# Patient Record
Sex: FEMALE | Race: WHITE | NOT HISPANIC OR LATINO | URBAN - METROPOLITAN AREA
[De-identification: names, ages, dates, MRNs, and addresses within clinical notes are randomized per-mention and may not be internally consistent; named-entity substitution may affect disease eponyms.]

---

## 2017-11-21 ENCOUNTER — COMMUNICATION - HEALTHEAST (OUTPATIENT)
Dept: SCHEDULING | Facility: CLINIC | Age: 77
End: 2017-11-21

## 2017-11-22 ENCOUNTER — COMMUNICATION - HEALTHEAST (OUTPATIENT)
Dept: INTERNAL MEDICINE | Facility: CLINIC | Age: 77
End: 2017-11-22

## 2017-11-30 ENCOUNTER — OFFICE VISIT - RIVER FALLS (OUTPATIENT)
Dept: FAMILY MEDICINE | Facility: CLINIC | Age: 77
End: 2017-11-30

## 2017-11-30 ASSESSMENT — MIFFLIN-ST. JEOR: SCORE: 1124.71

## 2018-01-09 ENCOUNTER — OFFICE VISIT - RIVER FALLS (OUTPATIENT)
Dept: FAMILY MEDICINE | Facility: CLINIC | Age: 78
End: 2018-01-09

## 2018-01-09 ASSESSMENT — MIFFLIN-ST. JEOR
SCORE: 1123.13
SCORE: 1110.19

## 2018-01-10 LAB
CREAT SERPL-MCNC: 1.16 MG/DL (ref 0.6–0.93)
GLUCOSE BLD-MCNC: 94 MG/DL (ref 65–99)

## 2018-01-15 ENCOUNTER — ANESTHESIA - HEALTHEAST (OUTPATIENT)
Dept: SURGERY | Facility: HOSPITAL | Age: 78
End: 2018-01-15

## 2018-01-15 ENCOUNTER — SURGERY - HEALTHEAST (OUTPATIENT)
Dept: SURGERY | Facility: HOSPITAL | Age: 78
End: 2018-01-15

## 2018-02-15 ENCOUNTER — OFFICE VISIT - RIVER FALLS (OUTPATIENT)
Dept: FAMILY MEDICINE | Facility: CLINIC | Age: 78
End: 2018-02-15

## 2018-04-05 ENCOUNTER — OFFICE VISIT - RIVER FALLS (OUTPATIENT)
Dept: FAMILY MEDICINE | Facility: CLINIC | Age: 78
End: 2018-04-05

## 2018-04-06 LAB
CREAT SERPL-MCNC: 1.05 MG/DL (ref 0.6–0.93)
GLUCOSE BLD-MCNC: 96 MG/DL (ref 65–99)

## 2018-05-04 ENCOUNTER — COMMUNICATION - RIVER FALLS (OUTPATIENT)
Dept: FAMILY MEDICINE | Facility: CLINIC | Age: 78
End: 2018-05-04

## 2018-05-04 ENCOUNTER — AMBULATORY - RIVER FALLS (OUTPATIENT)
Dept: FAMILY MEDICINE | Facility: CLINIC | Age: 78
End: 2018-05-04

## 2018-06-05 ENCOUNTER — RECORDS - HEALTHEAST (OUTPATIENT)
Dept: ADMINISTRATIVE | Facility: OTHER | Age: 78
End: 2018-06-05

## 2018-06-19 ENCOUNTER — HOSPITAL ENCOUNTER (OUTPATIENT)
Dept: NUCLEAR MEDICINE | Facility: CLINIC | Age: 78
Discharge: HOME | End: 2018-06-19
Attending: INTERNAL MEDICINE

## 2018-06-19 DIAGNOSIS — R10.9 ABDOMINAL PAIN: ICD-10-CM

## 2018-06-22 ENCOUNTER — COMMUNICATION - RIVER FALLS (OUTPATIENT)
Dept: FAMILY MEDICINE | Facility: CLINIC | Age: 78
End: 2018-06-22

## 2018-06-22 ENCOUNTER — OFFICE VISIT - RIVER FALLS (OUTPATIENT)
Dept: FAMILY MEDICINE | Facility: CLINIC | Age: 78
End: 2018-06-22

## 2018-08-24 ENCOUNTER — OFFICE VISIT - RIVER FALLS (OUTPATIENT)
Dept: FAMILY MEDICINE | Facility: CLINIC | Age: 78
End: 2018-08-24

## 2018-09-06 ENCOUNTER — OFFICE VISIT - RIVER FALLS (OUTPATIENT)
Dept: FAMILY MEDICINE | Facility: CLINIC | Age: 78
End: 2018-09-06

## 2018-10-11 ENCOUNTER — OFFICE VISIT - RIVER FALLS (OUTPATIENT)
Dept: FAMILY MEDICINE | Facility: CLINIC | Age: 78
End: 2018-10-11

## 2018-10-11 ASSESSMENT — MIFFLIN-ST. JEOR: SCORE: 1128.34

## 2019-01-01 ENCOUNTER — RECORDS - HEALTHEAST (OUTPATIENT)
Dept: LAB | Facility: CLINIC | Age: 79
End: 2019-01-01

## 2019-01-01 ENCOUNTER — OFFICE VISIT - RIVER FALLS (OUTPATIENT)
Dept: FAMILY MEDICINE | Facility: CLINIC | Age: 79
End: 2019-01-01

## 2019-01-01 ENCOUNTER — COMMUNICATION - RIVER FALLS (OUTPATIENT)
Dept: FAMILY MEDICINE | Facility: CLINIC | Age: 79
End: 2019-01-01

## 2019-01-01 LAB
25(OH)D3 SERPL-MCNC: 38.7 NG/ML (ref 30–80)
ALBUMIN SERPL-MCNC: 3.2 G/DL (ref 3.5–5)
ALBUMIN SERPL-MCNC: 3.4 G/DL (ref 3.5–5)
ALBUMIN UR-MCNC: ABNORMAL G/DL
ALBUMIN UR-MCNC: ABNORMAL MG/DL
ALBUMIN UR-MCNC: NEGATIVE MG/DL
ALP SERPL-CCNC: 68 U/L (ref 45–120)
ALP SERPL-CCNC: 77 U/L (ref 45–120)
ALT SERPL W P-5'-P-CCNC: 13 U/L (ref 0–45)
ALT SERPL W P-5'-P-CCNC: 25 U/L (ref 0–45)
ANION GAP SERPL CALCULATED.3IONS-SCNC: 11 MMOL/L (ref 5–18)
ANION GAP SERPL CALCULATED.3IONS-SCNC: 14 MMOL/L (ref 5–18)
APPEARANCE UR: ABNORMAL
APPEARANCE UR: CLEAR
APPEARANCE UR: CLEAR
AST SERPL W P-5'-P-CCNC: 18 U/L (ref 0–40)
AST SERPL W P-5'-P-CCNC: 35 U/L (ref 0–40)
BACTERIA #/AREA URNS HPF: ABNORMAL HPF
BACTERIA #/AREA URNS HPF: NORMAL /[HPF]
BACTERIA SPEC CULT: ABNORMAL
BACTERIA SPEC CULT: NO GROWTH
BACTERIA SPEC CULT: NORMAL
BILIRUB DIRECT SERPL-MCNC: 0.2 MG/DL
BILIRUB SERPL-MCNC: 0.3 MG/DL (ref 0–1)
BILIRUB SERPL-MCNC: 0.4 MG/DL (ref 0–1)
BILIRUB UR QL STRIP: NEGATIVE
BUN SERPL-MCNC: 10 MG/DL (ref 8–28)
BUN SERPL-MCNC: 8 MG/DL (ref 8–28)
CALCIUM SERPL-MCNC: 10 MG/DL (ref 8.5–10.5)
CALCIUM SERPL-MCNC: 9 MG/DL (ref 8.5–10.5)
CHLORIDE BLD-SCNC: 103 MMOL/L (ref 98–107)
CHLORIDE BLD-SCNC: 99 MMOL/L (ref 98–107)
CO2 SERPL-SCNC: 22 MMOL/L (ref 22–31)
CO2 SERPL-SCNC: 26 MMOL/L (ref 22–31)
COLOR UR AUTO: ABNORMAL
COLOR UR AUTO: NORMAL
COLOR UR AUTO: NORMAL
COLOR UR AUTO: YELLOW
CREAT SERPL-MCNC: 0.79 MG/DL (ref 0.6–1.1)
CREAT SERPL-MCNC: 0.81 MG/DL (ref 0.6–1.1)
EPITHELIAL CELLS UR: NORMAL
ERYTHROCYTE [DISTWIDTH] IN BLOOD BY AUTOMATED COUNT: 13.5 % (ref 11–14.5)
ERYTHROCYTE [DISTWIDTH] IN BLOOD BY AUTOMATED COUNT: 15.7 % (ref 11–14.5)
FERRITIN SERPL-MCNC: 266 NG/ML (ref 10–130)
GFR SERPL CREATININE-BSD FRML MDRD: >60 ML/MIN/1.73M2
GFR SERPL CREATININE-BSD FRML MDRD: >60 ML/MIN/1.73M2
GLUCOSE BLD-MCNC: 111 MG/DL (ref 70–125)
GLUCOSE BLD-MCNC: 78 MG/DL (ref 70–125)
GLUCOSE UR STRIP-MCNC: NEGATIVE MG/DL
HCT VFR BLD AUTO: 32.4 % (ref 35–47)
HCT VFR BLD AUTO: 36.9 % (ref 35–47)
HGB BLD-MCNC: 10.6 G/DL (ref 12–16)
HGB BLD-MCNC: 11.9 G/DL (ref 12–16)
HGB UR QL STRIP: ABNORMAL
HGB UR QL STRIP: NEGATIVE
IRON SERPL-MCNC: 79 UG/DL (ref 42–175)
KETONES UR STRIP-MCNC: ABNORMAL MG/DL
KETONES UR STRIP-MCNC: NEGATIVE MG/DL
LEUKOCYTE ESTERASE UR QL STRIP: ABNORMAL
LEUKOCYTE ESTERASE UR QL STRIP: NEGATIVE
MAGNESIUM SERPL-MCNC: 1.8 MG/DL (ref 1.8–2.6)
MCH RBC QN AUTO: 30.7 PG (ref 27–34)
MCH RBC QN AUTO: 31.1 PG (ref 27–34)
MCHC RBC AUTO-ENTMCNC: 32.2 G/DL (ref 32–36)
MCHC RBC AUTO-ENTMCNC: 32.7 G/DL (ref 32–36)
MCV RBC AUTO: 95 FL (ref 80–100)
MCV RBC AUTO: 95 FL (ref 80–100)
MUCOUS THREADS #/AREA URNS LPF: ABNORMAL LPF
NITRATE UR QL: NEGATIVE
PH UR STRIP: 6 [PH] (ref 4.5–8)
PH UR STRIP: 6 [PH] (ref 4.5–8)
PH UR STRIP: 6.5 [PH] (ref 4.5–8)
PH UR STRIP: 7 [PH] (ref 4.5–8)
PH UR STRIP: 7 [PH] (ref 5–8)
PLATELET # BLD AUTO: 211 THOU/UL (ref 140–440)
PLATELET # BLD AUTO: 302 THOU/UL (ref 140–440)
PMV BLD AUTO: 9.6 FL (ref 8.5–12.5)
PMV BLD AUTO: 9.7 FL (ref 8.5–12.5)
POTASSIUM BLD-SCNC: 3.6 MMOL/L (ref 3.5–5)
POTASSIUM BLD-SCNC: 3.9 MMOL/L (ref 3.5–5)
PROT SERPL-MCNC: 6.3 G/DL (ref 6–8)
PROT SERPL-MCNC: 7 G/DL (ref 6–8)
RBC # BLD AUTO: 3.41 MILL/UL (ref 3.8–5.4)
RBC # BLD AUTO: 3.87 MILL/UL (ref 3.8–5.4)
RBC #/AREA URNS AUTO: ABNORMAL HPF
RBC #/AREA URNS AUTO: NORMAL /[HPF]
SODIUM SERPL-SCNC: 135 MMOL/L (ref 136–145)
SODIUM SERPL-SCNC: 140 MMOL/L (ref 136–145)
SP GR UR STRIP: 1.01 (ref 1–1.03)
SP GR UR STRIP: 1.02 (ref 1–1.03)
SP GR UR STRIP: 1.03 (ref 1–1.03)
SQUAMOUS #/AREA URNS AUTO: ABNORMAL LPF
TRANS CELLS #/AREA URNS HPF: ABNORMAL LPF
TSH SERPL DL<=0.005 MIU/L-ACNC: 1.84 UIU/ML (ref 0.3–5)
UROBILINOGEN UR STRIP-ACNC: ABNORMAL
UROBILINOGEN UR STRIP-ACNC: NORMAL
UROBILINOGEN UR STRIP-ACNC: NORMAL
VALPROATE SERPL-MCNC: 33.2 UG/ML (ref 50–150)
VALPROATE SERPL-MCNC: 39.6 UG/ML (ref 50–150)
VIT B12 SERPL-MCNC: 904 PG/ML (ref 213–816)
WBC #/AREA URNS AUTO: >100 /[HPF]
WBC #/AREA URNS AUTO: >100 HPF
WBC #/AREA URNS AUTO: >100 HPF
WBC #/AREA URNS AUTO: ABNORMAL HPF
WBC CLUMPS #/AREA URNS HPF: PRESENT /[HPF]
WBC: 6.7 THOU/UL (ref 4–11)
WBC: 7 THOU/UL (ref 4–11)

## 2021-05-31 VITALS — BODY MASS INDEX: 27.11 KG/M2 | HEIGHT: 63 IN | WEIGHT: 153 LBS

## 2021-06-15 NOTE — ANESTHESIA CARE TRANSFER NOTE
Last vitals:   Vitals:    01/15/18 1500   BP: (!) 180/94   Pulse: 85   Resp: 17   Temp:    SpO2: 100%   Temp=97.2 oral     Patient's level of consciousness is drowsy  Spontaneous respirations: yes  Maintains airway independently: yes  Dentition unchanged: yes  Oropharynx: oropharynx clear of all foreign objects    QCDR Measures:  ASA# 20 - Surgical Safety Checklist: WHO surgical safety checklist completed prior to induction  PQRS# 430 - Adult PONV Prevention: 4558F - Pt received => 2 anti-emetic agents (different classes) preop & intraop  ASA# 8 - Peds PONV Prevention: NA - Not pediatric patient, not GA or 2 or more risk factors NOT present  PQRS# 424 - Ora-op Temp Management: 4559F - At least one body temp DOCUMENTED => 35.5C or 95.9F within required timeframe  PQRS# 426 - PACU Transfer Protocol: - Transfer of care checklist used  ASA# 14 - Acute Post-op Pain: ASA14B - Patient did NOT experience pain >= 7 out of 10

## 2021-06-15 NOTE — ANESTHESIA PREPROCEDURE EVALUATION
"Anesthesia Evaluation      Patient summary reviewed   No history of anesthetic complications     Airway   Mallampati: II  Neck ROM: full   Pulmonary - negative ROS and normal exam                          Cardiovascular - negative ROS and normal exam   Neuro/Psych    (+) neuromuscular disease,  dementia,     Endo/Other - negative ROS      GI/Hepatic/Renal    (+) GERD well controlled,   chronic renal disease CRI,      Other findings: Sensory neuropathy. Daughter states pt is very sensitive to narcotics, was on vicodin at home post-op and evidently \"passed out\" and 911 was called, also blaming narcotics for post-op memory loss.  No records to review.  Chart lists GERD but pt denies having it.  Had nephrectomy for renal cell Ca.  Nephrolithiasis.  Breast Ca.  K 4.3, Hg 13.6.      Dental - normal exam                        Anesthesia Plan  Planned anesthetic: general LMA  ETT if surgeon requests one.  No versed.  ASA 3   Induction: intravenous   Anesthetic plan and risks discussed with: patient and child/children    Post-op plan: routine recovery          "

## 2021-06-15 NOTE — ANESTHESIA POSTPROCEDURE EVALUATION
Patient: Debbie LOVE [place]  CYSTOSCOPY LEFT RETROGRADE PYELOGRAM, LEFT URETEROSCOPY, LASER LITHOTRIPSY, AND BASKET EXTRACTION OF STONE, LEFT STENT PLACEMENT  Anesthesia type: general    Patient location: PACU  Last vitals:   Vitals:    01/15/18 1505   BP: (!) 187/96   Pulse: 82   Resp: 16   Temp:    SpO2: 99%     Post vital signs: stable; BPs in pre-op had been quite high, they were going to take some manual ones as pt felt the high readings were from cuff pain.  Was not notified of new pressures and pt was up before induction in OR.  Dropped to systolic of 110 after induction.  BP elevated in PACU, pt comfortable, treating with hydralazine presently.  Level of consciousness: alert and conversant  Post-anesthesia pain: pain controlled  Post-anesthesia nausea and vomiting: no  Pulmonary: supplemental oxygen when seen.  Cardiovascular: stable and blood pressure at baseline  Hydration: adequate  Anesthetic events: no    QCDR Measures:  ASA# 11 - Ora-op Cardiac Arrest: ASA11B - Patient did NOT experience unanticipated cardiac arrest  ASA# 12 - Ora-op Mortality Rate: ASA12B - Patient did NOT die  ASA# 13 - PACU Re-Intubation Rate: ASA13B - Patient did NOT require a new airway mgmt  ASA# 10 - Composite Anes Safety: ASA10A - No serious adverse event    Additional Notes:

## 2022-02-11 VITALS
TEMPERATURE: 98.6 F | WEIGHT: 153 LBS | DIASTOLIC BLOOD PRESSURE: 89 MMHG | HEART RATE: 80 BPM | BODY MASS INDEX: 27.11 KG/M2 | HEART RATE: 76 BPM | SYSTOLIC BLOOD PRESSURE: 153 MMHG | HEIGHT: 63 IN | DIASTOLIC BLOOD PRESSURE: 88 MMHG | WEIGHT: 154.8 LBS | BODY MASS INDEX: 27.86 KG/M2 | SYSTOLIC BLOOD PRESSURE: 150 MMHG

## 2022-02-11 VITALS
WEIGHT: 151.8 LBS | TEMPERATURE: 98.9 F | DIASTOLIC BLOOD PRESSURE: 78 MMHG | DIASTOLIC BLOOD PRESSURE: 91 MMHG | WEIGHT: 159 LBS | SYSTOLIC BLOOD PRESSURE: 132 MMHG | HEART RATE: 92 BPM | SYSTOLIC BLOOD PRESSURE: 120 MMHG | HEART RATE: 88 BPM | TEMPERATURE: 97.9 F

## 2022-02-11 VITALS
SYSTOLIC BLOOD PRESSURE: 158 MMHG | DIASTOLIC BLOOD PRESSURE: 86 MMHG | WEIGHT: 151.8 LBS | TEMPERATURE: 97.8 F | HEART RATE: 72 BPM | DIASTOLIC BLOOD PRESSURE: 96 MMHG | HEART RATE: 60 BPM | SYSTOLIC BLOOD PRESSURE: 146 MMHG | TEMPERATURE: 98.4 F | WEIGHT: 149 LBS

## 2022-02-11 VITALS
DIASTOLIC BLOOD PRESSURE: 90 MMHG | HEART RATE: 74 BPM | SYSTOLIC BLOOD PRESSURE: 130 MMHG | WEIGHT: 159.08 LBS | BODY MASS INDEX: 28.63 KG/M2 | OXYGEN SATURATION: 96 % | TEMPERATURE: 98.7 F

## 2022-02-11 VITALS
SYSTOLIC BLOOD PRESSURE: 141 MMHG | TEMPERATURE: 98.9 F | HEIGHT: 63 IN | WEIGHT: 157 LBS | HEART RATE: 83 BPM | DIASTOLIC BLOOD PRESSURE: 89 MMHG | BODY MASS INDEX: 27.82 KG/M2

## 2022-02-11 VITALS
DIASTOLIC BLOOD PRESSURE: 98 MMHG | HEART RATE: 72 BPM | TEMPERATURE: 98.1 F | SYSTOLIC BLOOD PRESSURE: 145 MMHG | HEART RATE: 76 BPM | SYSTOLIC BLOOD PRESSURE: 136 MMHG | DIASTOLIC BLOOD PRESSURE: 80 MMHG | SYSTOLIC BLOOD PRESSURE: 162 MMHG | DIASTOLIC BLOOD PRESSURE: 88 MMHG | WEIGHT: 156.4 LBS

## 2022-02-11 VITALS
HEART RATE: 79 BPM | SYSTOLIC BLOOD PRESSURE: 150 MMHG | WEIGHT: 156.2 LBS | TEMPERATURE: 98.7 F | DIASTOLIC BLOOD PRESSURE: 90 MMHG | BODY MASS INDEX: 27.68 KG/M2 | HEIGHT: 63 IN

## 2022-02-16 NOTE — TELEPHONE ENCOUNTER
Entered by Estee Lemos CMA on February 27, 2019 1:56:00 PM CST  ---------------------  From: Estee Lemos CMA   To: [employer] [id number]    Sent: 2/27/2019 1:56:00 PM CST  Subject: Medication Management     ** Submitted: **  Order:memantine (memantine 10 mg oral tablet)  1 tab(s)  Oral  bid  Qty:  60 tab(s)        Days Supply:  30        Refills:  0          Substitutions Allowed     Route To [facility] [employer] [facility]    Signed by Estee Lemos CMA  2/27/2019 1:55:00 PM    ** Submitted: **  Complete:memantine (memantine 10 mg oral tablet)   Signed by Estee Lemos CMA  2/27/2019 1:55:00 PM    ** Not Approved:  **  memantine (MEMANTINE 10MG TABLETS)  TAKE 1 TABLET BY MOUTH TWICE DAILY  Qty:  60 tab(s)        Days Supply:  30        Refills:  0          Substitutions Allowed     Route To [facility] [employer] [facility]   Signed by Estee Lemos CMA          contacted pt's  and advised due for appt, he agrees and was transferred to [facility]  last seen 2/7 UTI  10/11 wandering    refilled x 1mo per protocol      ------------------------------------------  From: [employer] [facility]  To: Garrison Lopez MD  Sent: February 27, 2019 12:47:23 PM CST  Subject: Medication Management  Due: February 28, 2019 12:47:23 PM CST    ** On Hold Pending Signature **  Drug: memantine (memantine 10 mg oral tablet)  1 TAB(S) ORAL BID  Quantity: 60 tab(s)     Days Supply: 0         Refills: 0  Substitutions Allowed  Notes from Pharmacy: Needs appt for further refills    Dispensed Drug: memantine (memantine 10 mg oral tablet)  TAKE 1 TABLET BY MOUTH TWICE DAILY  Quantity: 60 tab(s)     Days Supply: 30        Refills: 0  Substitutions Allowed  Notes from Pharmacy:   ------------------------------------------

## 2022-02-16 NOTE — LETTER
(Inserted Image. Unable to display)       May 02, 2019      SURINDER VARGAS  [address], [place] [id number]          Dear SURINDER,      Thank you for selecting [facility] for your healthcare needs.     Our records indicate you are due for the following services:     Follow-up Office Visit    To schedule an appointment or if you have further questions, please contact your primary clinic:   [facility]       [phone]   [facility]       [phone]              [facility]     [phone]    Powered by [employer]    Sincerely,    Garrison Lopez MD

## 2022-02-16 NOTE — RESULTS
-------------------------------- Original Report -------------------------------    EXAM:  NUCLEAR MEDICINE GASTRIC EMPTYING, SOLID - INITIAL STUDY    CLINICAL INFORMATION: Nausea and pain x1 year.    TECHNICAL INFORMATION: 1 mCi technetium 99 sulfur colloid with scrambled eggs,  toast and water.       COMPARISON: CT abdomen pelvis 8/2/2018           INTERPRETATION:    At 1 hour, 80% radiotracer gastric retention is identified, at 2 hours, 60%  radiotracer gastric retention, at 3 hours 30% radiotracer gastric retention and  at 4 hours, 4% radiotracer gastric retention is present.    CONCLUSION:  1.  Normal solid phase gastric emptying times.          Read by: Anthony Reid M.D.  Reviewed and Electronically Signed by: Anthony Reid M.D.

## 2022-02-16 NOTE — TELEPHONE ENCOUNTER
---------------------  From: Kayla Guido LPN (Phone Messages Pool ([address]224_[facility]))   To: [facility] Message Pool ([address]_[place] [facility] [place]);     Sent: 5/1/2019 4:41:11 PM CDT  Subject: med list     Phone Message    PCP:   asked for [facility]      Time of Call:  4:20pm       Person Calling:  Hailey- Clinical Director at [facility]  Phone number:  [phone]    Returned call at: 4:30pm    Note:   Hailey LM stating pt is moving into their memory care community tomorrow and just did her face to face in home. Hailey says the signed orders that she received are not the up to date dose and administration instructions.    Hailey says she needs orders clarified so she can send to pharmacy for them to fill for pt tomorrow.    Returned call and Hailey says the signed orders from BRM and the med list from the spouse are very different- she just needs verbal authorization from physician.    Verified with Hailey the meds that are prescribed on med list.  depakote, memantine, omeprazole and prochlorperazine  Pt is NOT taking trazadone    Hailey says per spouse pt is taking   risperadone 0.25mg in AM and 1mg hs (instead of trazadone)  aredx capsule (eye vitamin) BID  vitamin D 2000 units in evening.    Pt is not taking multivitamin or scheduled tylenol as indicated on BR's med list.    She needs clarification on these and if [facility] will be prescribing.      Last office visit and reason:  4/4/19 dementia, anorexiareviewed list with BRM and ok with Risperidone .25mg qam and 1mg at 4pm(when pt's  was administering and helps with Sun Down syndrome)  also adding Vit D3 2000IU that  has been giving.      Called Hailey and discussed.   Will fax over verbal order for JOSIAH to sign

## 2022-02-16 NOTE — TELEPHONE ENCOUNTER
---------------------From: Estee Lemos CMA ([employer] Message Pool ([address]_[facility])) To: Garrison Lopez MD;   Sent: 4/10/2019 8:48:12 AM CDTSubject: General Message-concerns Phone MessagePCP:   Knowable  Time of Call:  _   Person Calling:  Mary (daughter) - Dropped off concernsPhone number:  [phone] call at: _Note:   c/o stomach issues every morning since starting Seroquel.  Did not have this problem when on Risperdal.  Hallucinations are escalating.  Needs assistance in ADL's, belligerent at times, Cry's.  Is there anything for the hallucinations as this upsets her, (Parkinson med advertised?)  Was taking Risperdal 1 q am and 2 q pm, could she go back to that? Increase dose?  Averaging 1-2 lorazepam daily.  Very nervous, scared, paranoid.  Last office visit and reason:  4/4---------------------From: Garrison Lopez MD To: [employer] Pool ([address]_[facility]);   Sent: 4/11/2019 1:13:43 PM CDTSubject: RE: General Message-concerns let's have her revert back to risperdal at 0.5mg BID and stop seroquel.  I also want to start her on Depakote 125mg TID to see if this helps with mood stability.  Have her plan on seeing me back in 2-3 weeksdiscussed with Charlie and Mary and agree to plan.  Rx for Depakote only sent since he has Risperdal at home.  Does state he was giving Risperdal 0.5mg in am juany 1mg in evening.  Asked him to try as ordered now and can discuss at f/u if we need to make changes.  Hopefully with the addition of Depakote may be helpful

## 2022-02-16 NOTE — TELEPHONE ENCOUNTER
Entered by Angela Pope CMA on January 21, 2019 1:08:31 PM CST  ---------------------  From: Angela Pope CMA   To: [employer] [id number]    Sent: 1/21/2019 1:08:31 PM CST  Subject: Medication Management     ** Submitted: **  Order:lisinopril (lisinopril 10 mg oral tablet)  1 tab(s)  Oral  daily  Qty:  30 tab(s)        Refills:  0          Substitutions Allowed     Route To [facility] [employer] [facility]    Signed by Angela Pope CMA  1/21/2019 1:07:00 PM    ** Submitted: **  Complete:lisinopril (lisinopril 10 mg oral tablet)   Signed by Angela Pope CMA  1/21/2019 1:08:00 PM    ** Not Approved:  **  lisinopril (LISINOPRIL 10MG TABLETS)  TAKE 1 TABLET BY MOUTH DAILY  Qty:  90 tab(s)        Days Supply:  90        Refills:  0          TIANA     Route To [facility] [employer] [id number]   Signed by Angela Pope CMA            ------------------------------------------  From: [employer] [id number]  To: Garrison Lopez MD  Sent: January 19, 2019 12:50:27 PM CST  Subject: Medication Management  Due: January 20, 2019 12:50:27 PM CST    ** On Hold Pending Signature **  Drug: lisinopril (lisinopril 10 mg oral tablet)  1 TAB(S) PO DAILY  Quantity: 90 tab(s)     Days Supply: 0         Refills: 2  Substitutions Allowed  Notes from Pharmacy:     Dispensed Drug: lisinopril (lisinopril 10 mg oral tablet)  TAKE 1 TABLET BY MOUTH DAILY  Quantity: 90 tab(s)     Days Supply: 90        Refills: 0  Substitutions Allowed  Notes from Pharmacy:   ------------------------------------------Med Refill      Date of last office visit and reason:  10/11/18; wandering      Date of last Med Check / Px:   2/15/18  Date of last labs pertaining to med:  4/5/18    RTC order in chart:  yes    For Protocol refill, has patient been contacted:  n/a

## 2022-02-16 NOTE — PROGRESS NOTES
Patient:   SURINDER VARGAS            MRN: [id number]            FIN: [id number]               Age:   77 years     Sex:  Female     :  1940   Associated Diagnoses:   Unspecified dementia without behavioral disturbance; Nephrolithiasis; History of nephrectomy, unilateral; White coat syndrome without hypertension; Adverse effect of morphinan opioid   Author:   Brandon Almeida MD      Visit Information   Visit type:  Preoperative.    Accompanied by:  Spouse.    Source of history:  Self, Spouse, Medical record.    History limitation:  None.       Chief Complaint   2018 10:46 AM CST    pre op - kidney stone, 1/15/18 at [place]      History of Present Illness   This patient is a woman who has a single kidney as a result of a nephrectomy for cancer and was found to have a stone in the other kidney.  She is going to undergo an extraction procedure.  She is sensitive to narcotics and has had problems with them; it doesn t sound like allergic in nature.  No problems with surgical bleeding.  No cardiopulmonary disease.  No snoring, daytime sleepiness, or sleep apnea.  She has dementia.  No fevers, chills or recent infections.                Review of Systems   Constitutional:  No fever, No chills, No weakness, No fatigue.    Respiratory:  No shortness of breath, No cough.    Cardiovascular:  No chest pain, No palpitations, No peripheral edema, No syncope.    Gastrointestinal:  H/O GERD but no symptoms on PPI, No nausea, No vomiting, No diarrhea, No abdominal pain.    Genitourinary:  Nocturia, No hematuria.    Hematology/Lymphatics:  No bruising tendency, No bleeding tendency.    Musculoskeletal:  No joint pain, No muscle pain.    Neurologic:  Mild dementia and peripheral sensory neuropathy otherwise negative.       Health Status   Allergies:    Allergic Reactions (Selected)  Severe  Propoxyphene (Passes out)  Severity Not Documented  Opium (Passes out)  OxyCODONE (No reactions were documented)   Medications:   (Selected)   Documented Medications  Documented  Cristy-Montgomery: po, q4 hrs, PRN: as needed for indigestion, 0 Refill(s), Type: Maintenance  Cogni Shield: Cogni Shield, See Instructions, Instructions: BID for memory, Supply, 0 Refill(s), Type: Maintenance  Gaviscon: PRN: for indigestion, 0 Refill(s), Type: Maintenance  Pepcid Complete: 1 tab(s), po, q12 hrs, 0 Refill(s), Type: Maintenance  docusate-senna 50 mg-8.6 mg oral tablet: 1 tab(s), po, daily, PRN: for constipation, 0 Refill(s), Type: Maintenance  fexofenadine 180 mg oral tablet: 1 tab(s) ( 180 mg ), po, daily, 0 Refill(s), Type: Maintenance  lysine 500 mg oral tablet: 2 tab(s) ( 1,000 mg ), po, daily, 0 Refill(s), Type: Maintenance  memantine 10 mg oral tablet: 1 tab(s) ( 10 mg ), po, bid, 0 Refill(s), Type: Maintenance  omeprazole 20 mg oral delayed release capsule: 1 cap(s) ( 20 mg ), PO, Daily, # 90 cap(s), 0 Refill(s), Type: Maintenance  ondansetron 4 mg oral tablet, disintegratin tab(s) ( 4 mg ), PO, qid, PRN: for nausea/vomiting, 0 Refill(s), Type: Maintenance  polyethylene glycol 3350: ( 17 gm ), po, daily, 0 Refill(s), Type: Maintenance   Problem list:    All Problems  Arthritis / SNOMED CT 8708584 / Confirmed  Unspecified dementia without behavioral disturbance / SNOMED CT 24163357 / Confirmed  History of breast cancer / SNOMED CT 8538399011 / Confirmed  Neuropathy / SNOMED CT 6972386090 / Confirmed  Vasomotor rhinitis / SNOMED CT 50492375 / Confirmed  Resolved: Breast cancer / SNOMED CT 731100702  Resolved: Cancer of kidney / SNOMED CT 633183433  Canceled: History of kidney cancer / SNOMED CT 760958279      Histories   Past Medical History:    Active  Arthritis (9100513)  Unspecified dementia without behavioral disturbance (87059749)  Vasomotor rhinitis (49100596)  Neuropathy (7473869749)  History of breast cancer (1740046784)  Resolved  Breast cancer (124415173):  Resolved.  Cancer of kidney (757369165):  Resolved.  Comments:  2017 CST  11:24 AM CST - Aminta Estee GOODE  right renal cell carcinoma s/p right nephrectomy   Family History:    No family history items have been selected or recorded.   Procedure history:    Date of last mammogram ([id number]) on 2017 at 77 Years.  Comments:  2017 12:07 PM - [place] Estee GOODE  Unilateral Diag Mammo of the right breast   Recommend a 6-month f/u BL mammo for continuation of yrly screening.  Biopsy (474122233) on 3/23/2015 at 74 Years.  Comments:  2017 1:15 PM - Reyna Vasquez  Of antrum.  Colonoscopy (992125495) in  at 71 Years.  Comments:  2017 12:07 PM - Aminta GOODEEstee  Sigmoid polyp  Lumpectomy (0821415228).  Trigger thumb (677770264).  Comments:  2017 11:42 AM - Reyna Vasquez  Surgery - both.  Cataract surgery (268045517).  Nephrectomy (2949172919).  Comments:  2017 1:38 PM - Reyna Vasquez  Right  Total knee replacement (9895712681).  Comments:  2017 1:36 PM - Reyna Vasquez  bilateral  Tonsillectomy (113834950).  Tubal ligation (220438751).  Radiation (390236933).   section (58118849).  Comments:  2017 1:34 PM - Reyna Vasquez  X 1  Chemotherapy (367055323).   Social History:        Tobacco Assessment: Denies Tobacco Use            Never (less than 100 in lifetime)      Home and Environment Assessment            Marital status: .        Physical Examination   Vital Signs   2018 11:00 AM CST Systolic Blood Pressure 150 mmHg  HI    Diastolic Blood Pressure 88 mmHg  HI    Mean Arterial Pressure 109 mmHg    BP Site Right arm    BP Method Manual   2018 10:46 AM CST Peripheral Pulse Rate 76 bpm    Systolic Blood Pressure 157 mmHg  HI    Diastolic Blood Pressure 91 mmHg  HI    Mean Arterial Pressure 113 mmHg    BP Site Right arm      Measurements from flowsheet : Measurements   2018 10:46 AM CST Height Measured - Standard 62.5 in    Weight Measured - Standard 153 lb    BSA 1.75 m2    Body Mass Index 27.54 kg/m2  HI      General:   Alert and oriented, No acute distress.    Eye:  Extraocular movements are intact.    HENT:  Normocephalic, Tympanic membranes are clear, Normal hearing, Oral mucosa is moist, No pharyngeal erythema.    Neck:  Supple, No carotid bruit, No jugular venous distention, No lymphadenopathy, No thyromegaly.    Respiratory:  Lungs are clear to auscultation, Respirations are non-labored.    Cardiovascular:  Normal rate, Regular rhythm, No murmur, No gallop, Normal peripheral perfusion, No edema.    Gastrointestinal:  Soft, Non-tender, Non-distended, No organomegaly.    Genitourinary:  No costovertebral angle tenderness.    Musculoskeletal:  Normal gait.    Integumentary:  No pallor.    Neurologic:  No focal deficits, Cranial Nerves II-XII are grossly intact.    Cognition and Speech:  Oriented, Speech clear and coherent.    Psychiatric:  Cooperative, Appropriate mood & affect.       Review / Management   ECG interpretation:  Time  1/9/2018 11:17:00 AM, Rate  74  beats per minute.       Impression and Plan   Diagnosis     Unspecified dementia without behavioral disturbance (HJS86-NZ F03.90).     Nephrolithiasis (BLW63-VO N20.0).     History of nephrectomy, unilateral (ZLR38-SU Z90.5).     White coat syndrome without hypertension (JEW03-PH R03.0).     Adverse effect of morphinan opioid (ZRE25-SZ T40.2X5A).     Summary:  Stable for planned cysto with stone removal. Undiagnosed HTN?.    Orders     Orders (Selected)   Outpatient Orders  Ordered  Ambulatory BP monitor (Request): White coat syndrome without hypertension  Ordered (Dispatched)  Basic Metabolic Panel* ([facility]): Specimen Type: Serum, Collection Date: 01/09/18 10:58:00 CST  CBC (h/h, RBC, indices, WBC, Plt)* ([facility]): Specimen Type: Blood, Collection Date: 01/09/18 10:58:00 CST.

## 2022-02-16 NOTE — LETTER
(Inserted Image. Unable to display)   February 11, 2019      SURINDER VARGAS      [address], [place] [id number]        Dear SURINDER,    Thank you for selecting [facility] for your healthcare needs.  Below you will find the results of your recent tests done at our clinic.    Urine culture shows E.coli which should be sensitive to the prescribed antibiotics. Please call if not better.      Result Name Current Result Previous Result   Culture Urine ((A)) See comment 2/7/2019  See comment 10/11/2018       Please contact me or my assistant at [phone] if you have any questions.     Sincerely,        Brandon Almeida MD

## 2022-02-16 NOTE — PROGRESS NOTES
Chief Complaint    Pt c/o ongoing nausea and stomach aches. Has seen [facility] and no dx confirmed.  History of Present Illness      patient present to clinic today for ongoing nausea, hx of neuropathy, abnml esophagram, and dementia.  She is here today with her  and her daughter.  Patient's quality of life is significantly impacted by her ongoing problems with nausea.  It seems unrelated to any specific type of food.  For example, sometimes if she has tomatoes it seems to cause nausea but at times she can eat tomatoes have no problems.  Sometimes he will go out to eat and she will try one bite of her food and she will be able to eat it so they will pack it up and take it to go and then the next day she is able to eat the same food and have no symptoms.  She does not have specific abdominal pain or changes in bowel function with different types of food.  They have not An actual food journal but has been trying to pay closer attention to foods that she is eaten and symptoms.  Sometimes the bouts of nausea will last for several days at a time.  Notes from her recent appointment with [facility] revealed they had felt that her nausea had possibly been secondary to a urinary tract infection.  Otherwise they did not have a diagnosis that they could contribute the symptoms to.  They recommended that patient have her urinary tract infection treated and then return to clinic.  Reviewing the note however does not show that [facility] had reviewed the results of patient's recent esophagram.  This had been ordered by her primary care provider, Dr. Garrison Lopez.  It revealed that patient had some esophageal motility dysfunction.  Patient denies any perceived difficulties with swallowing.  She denies problems with feeling like she is choking when she is eating or drinking.  Her daughter, who is with us today, reports that she has never noticed her mom having difficulty with swallowing.  Chart review also  shows that patient has some dementia and some nonspecific neuropathy.  These are nonspecific or not otherwise specified diagnoses in her chart.  MRI had shown that she had some mild cerebral and cerebellar atrophy.  She also reports some allergies to environmental allergens but is not sure if she is ever had allergy testing.  Review of 156 pages of outside medical records have been scanned into her chart do not reveal any allergy testing results.  It is possible that her nausea may be related to a food allergy.  She does report that the ondansetron orally disintegrating tablets to help with her feelings of nausea however she will frequently needed for 3 times a day dosing for several days in a row.  During this time she still not able to eat very well.  She do not usually have vomiting associated with the nausea but rather just anorexia.      Review of systems is negative except as per HPI including:  no fevers, chills, sore throat, runny nose, nausea, vomiting, constipation, diarrhea, rash or new skin lesions, chest pain, palpitations, slurred speech, new paresthesia, shortness of breath or wheeze.      Exam:      General: alert and oriented ×3 no acute distress.      HEENT: pupils are equal round and reactive to light extraocular motion is intact. Normocephalic and atraumatic.       Hearing is grossly normal and there is no otorrhea.       Nares are patent there is no rhinorrhea.       Mucous membranes are moist and pink.      Chest: has bilateral rise with no increased work of breathing.      Cardiovascular: normal perfusion and brisk capillary refill.      Musculoskeletal: no gross focal abnormalities and normal gait.      Neuro: no gross focal abnormalities and memory seems intact.      Psychiatric: speech is clear and coherent and fluent. Patient dressed appropriately for the weather. Mood is appropriate and affect is full.                     Discussed with patient to return to clinic if symptoms worsen or do  not improve  Physical Exam   Vitals & Measurements    T: 97.8   F (Tympanic)  HR: 72(Peripheral)  BP: 158/96     WT: 149 lb   Assessment/Plan       Nausea (R11.0)        Given the significant impact on her quality of life it seems reasonable to continue to try a to find a cause of her symptoms.  We discussed that given her esophagram showing abnormal motility it seems possible that she is also having difficulty with gastric emptying.  I would like to have patient undergo a gastric motility study prior to her follow-up with GI which is currently scheduled for November.  Certainly if this comes back abnormal it would be a reason to try to get back with GI sooner.  Another avenue that we could consider would be doing food allergy testing.  However, I would like to do the gastric emptying study first given the abnormal esophagram with a history of neuropathy and dementia.         We also discussed that we could try acupuncture as a complementary and alternative treatment I cannot promise that would help her but acupuncture does seem to work well for some people.  I did caution her that Medicare does not cover acupuncture usually so would encourage her to double check would be willing to pocket for the services.         25 minutes was spent with patient in direct face-to-face contact of which were than 50% of the time was spent counseling patient care.         Orders:          35760 office outpatient visit 25 minutes (Charge), Quantity: 1, Nausea  Unspecified dementia without behavioral disturbance  Neuropathy          [facility] Gastric Emptying Study (Request), Nausea                Neuropathy (G62.9)         Orders:          14798 office outpatient visit 25 minutes (Charge), Quantity: 1, Nausea  Unspecified dementia without behavioral disturbance  Neuropathy                Unspecified dementia without behavioral disturbance (F03.90)         Orders:          45766 office outpatient visit 25 minutes (Charge), Quantity: 1,  Nausea  Unspecified dementia without behavioral disturbance  Neuropathy               25 minutes spent with patient in direct face to face contact, > 50% of time spent counseling and coordinating care.   Patient Information     Name:SURINDER VARGAS      Address:      [address] [place] [id number]     Sex:Female     YOB: 1940     [phone]     Emergency Contact:JUANY COLLIER     MRN:[id number]     FIN:[id number]     Location:[facility]     Date of Service:2018      Primary Care Physician:       John GLASS, Garrison, [phone]      Attending Physician:       Maeve GLASS, Charlette, [phone]  Problem List/Past Medical History    Ongoing     Arthritis     History of breast cancer     History of nephrectomy, unilateral       Comments: for cancer     Hypertension     Neuropathy     Unspecified dementia without behavioral disturbance     Vasomotor rhinitis    Historical     Breast cancer     Cancer of kidney       Comments: right renal cell carcinoma s/p right nephrectomy     Nephrolithiasis  Procedure/Surgical History     Esophagogastroduodenoscopy (2018)            Comments:      Indication:  Abdominal pain (failed treatment).     History of ureteroscopy and laser lithotripsy (01/15/2018)            Comments:      cysto, left retrograde pyelogram, fluoroscopy for left ureteral dilation, left ureteroscoy with holmium laser lithotripsy, left ureteral stent placement     Date of last mammogram (2017)            Comments:      Unilateral Diag Mammo of the right breast      Recommend a 6-month f/u BL mammo for continuation of yrly screening.     Biopsy (2015)            Comments:      Of antrum.     Colonoscopy ()            Comments:      Sigmoid polyp     Cataract surgery            section            Comments:      X 1     Chemotherapy           Lumpectomy           Nephrectomy            Comments:      Right      Radiation           Tonsillectomy           Total knee replacement            Comments:      bilateral     Trigger thumb            Comments:      Surgery - both.     Tubal ligation        Medications     polyethylene glycol 3350: 17 gm, po, daily, 0 Refill(s).     docusate-senna 50 mg-8.6 mg oral tablet: 1 tab(s), po, daily, PRN: for constipation, 0 Refill(s).     fexofenadine 180 mg oral tablet: 180 mg, 1 tab(s), po, daily, PRN: as needed for allergy symptoms, 0 Refill(s).     ondansetron 4 mg oral tablet, disintegratin mg, 1 tab(s), PO, qid, PRN: for nausea/vomiting, 0 Refill(s).     Gaviscon: PRN: for indigestion, 0 Refill(s).     Cristy-Mantador: po, q4 hrs, PRN: as needed for indigestion, 0 Refill(s).     Pepcid Complete: 1 tab(s), po, q12 hrs, PRN: as needed for dyspepsia, 0 Refill(s).     lisinopril 10 mg oral tablet: 10 mg, 1 tab(s), PO, Daily, 90 tab(s), 3 Refill(s).     omeprazole 20 mg oral delayed release capsule: 20 mg, 1 cap(s), PO, bid, Take 30 minutes before meal, 180 cap(s), 3 Refill(s).     memantine 10 mg oral tablet: See Instructions, TAKE 1 TABLET BY MOUTH TWICE DAILY, 180 tab(s), 1 Refill(s).          Allergies    propoxyphene (passes out)    opium (passes out)    oxyCODONE  Social History    Smoking Status - 2018     Never smoker     Home and Environment      Marital status: ., 2017     Tobacco - Denies Tobacco Use, 2017      Never (less than 100 in lifetime), 2017  Immunizations      Vaccine Date Status      influenza virus vaccine, inactivated 2017 Recorded  Lab Results       Lab Results (Last 4 results within 90 days)        UA Epithelial Cells: Moderate [None  - Few] (18 11:34:00 CDT)       UA WBC: >100 [None  - 5] (18 11:34:00 CDT)       UA RBC: 26-50 [None  - 2] (18 11:34:00 CDT)       UA Bacteria: Moderate [None  - Few] (18 11:34:00 CDT)       Culture Urine: See comment Abnormal [None  - 5] (18 19:53:00 CDT)

## 2022-02-16 NOTE — TELEPHONE ENCOUNTER
Entered by Angela Pope CMA on January 25, 2019 10:16:20 AM CST  ---------------------  From: Angela Pope CMA   To: [facility] [id number]    Sent: 1/25/2019 10:16:19 AM CST  Subject: Medication Management     ** Submitted: **  Order:memantine (memantine 10 mg oral tablet)  1 tab(s)  Oral  bid  Qty:  60 tab(s)        Refills:  0          Substitutions Allowed     Route To [facility] [id number]    Signed by Angela Pope CMA  1/25/2019 10:16:00 AM    ** Submitted: **  Complete:memantine (memantine 10 mg oral tablet)   Signed by Angela Pope CMA  1/25/2019 10:16:00 AM    ** Not Approved:  **  memantine (MEMANTINE 10MG TABLETS)  TAKE 1 TABLET BY MOUTH TWICE DAILY  Qty:  180 tab(s)        Days Supply:  90        Refills:  0          TIANA     Route To [facility] - [facility] [id number]   Signed by Angela Pope CMA            ------------------------------------------  From: [facility] [id number]  To: Garrison Lopez MD  Sent: January 25, 2019 10:06:41 AM CST  Subject: Medication Management  Due: January 26, 2019 10:06:41 AM CST    ** On Hold Pending Signature **  Drug: memantine (memantine 10 mg oral tablet)  1 TAB(S) PO BID,X90 DAY(S)  Quantity: 180 tab(s)    Days Supply: 0         Refills: 0  Substitutions Allowed  Notes from Pharmacy:     Dispensed Drug: memantine (memantine 10 mg oral tablet)  TAKE 1 TABLET BY MOUTH TWICE DAILY  Quantity: 180 tab(s)    Days Supply: 90        Refills: 0  Substitutions Allowed  Notes from Pharmacy:   ------------------------------------------Med Refill      Date of last office visit and reason:  10/11/18; wandering      Date of last Med Check / Px:   2/15/18  Date of last labs pertaining to med:  4/5/18    RTC order in chart:  yes    For Protocol refill, has patient been contacted:  n/a

## 2022-02-16 NOTE — LETTER
(Inserted Image. Unable to display)     February 04, 2019      SURINDER VARGAS  [address], [place] [id number]          Dear SURINDER,      Thank you for selecting [facility] ([facility] [place] for your healthcare needs.    Our records indicate you are due for the following services:    Hypertension check ~ please remember to bring your at-home blood pressure readings with you to your appointment.     Non-Fasting Labs.    If you had your labs done at another facility or with Direct Access Lab Testing at [facility], please bring in a copy of the results to your next visit, mail a copy, or drop off a copy of your results to your Healthcare Provider.    You are due for lab work and an office visit; please schedule the lab appointment 1 week before the office visit.  This will assure all results are available to discuss with your provider during your visit.    **It is very helpful if you bring your medication bottles to your appointment.  This assures we have all of your current medications, including strength and dosing information, documented accurately in your medical record.    To schedule an appointment or if you have further questions, please contact your primary clinic:   [facility]       [phone]   [facility]       [phone]              [facility]     [phone]      Powered by [employer]    Sincerely,    Garrison Lopez MD

## 2022-02-16 NOTE — TELEPHONE ENCOUNTER
SURINDER VARGASKena :  1940  2019 MRN:  [id number]  AFTER HOURS PHONE NOTE: [phone]  Time Paged: 6:31 p.m.   Time Call Retuned: 6:34 p.m.    I returned the call to her daughter, Mary.  The patient is an elderly female who has been having recurrent urinary tract infections.  Her symptoms have recurred.  She was last treated one month ago.     P: I sent in a prescription for Bactrim.  If ongoing concerns she can follow up in Urgent Care over the weekend or follow up in clinic next week.    D:  2019  T:  2019                                                Kim German MD/sq

## 2022-02-16 NOTE — LETTER
(Inserted Image. Unable to display)     October 07, 2019      SURINDER VARGAS  [address], [place] [id number]          Dear SURINDER,      Thank you for selecting [facility] [place] for your healthcare needs.      Our records indicate you are due for the following services:     Follow-up office visit.      To schedule an appointment or if you have further questions, please contact your primary clinic:   [facility]       [phone]   Count includes the [facility]       [phone]              [facility]     [phone]      Powered by [employer]    Sincerely,    Garrison Lopez MD

## 2022-02-16 NOTE — PROGRESS NOTES
Patient:   SURINDER VARGAS            MRN: [id number]            FIN: [id number]               Age:   78 years     Sex:  Female     :  1940   Associated Diagnoses:   Unspecified dementia without behavioral disturbance; History of breast cancer; Vasomotor rhinitis; Nocturia   Author:   Garrison Lopez MD      Visit Information      Date of Service: 2019 01:09 pm  Performing Location: [facility]  Encounter#: [id number]      Primary Care Provider (PCP):  JOHANN -UNKNOWN,    NPI# [id number]      Referring Provider:  Garrison Lopez MD    NPI# [id number]      Chief Complaint            Additional Information:No additional information recorded during visit.   Chief complaint and symptoms as noted above and confirmed with patient.  Recent lab and diagnostic studies reviewed with patient      History of Present Illness   2017: Rosalie presents to clinic to establish care.  She is the mother of Mary Hargrove.  She and her , Charlie, recently relocated from [place] to [place].  Still trying to get acquainted to the area.  They are living in a town home.  No troubles with living independently.  Rosalie has dealt with dementia over the past several years.  Currently maintained on memantine.  It sounds like she was previously trialed on acetylcholinesterase inhibitor and generally did not tolerate well.  She is previous neurology assessment ruling out alternative forms of dementia.  She no longer drives.  No issues with behavioral disturbances or hallucinations.  Sounds like she and Charlie have fared well as a team.  No decisional incapacity or activated power of  previously.  She has a previous history of renal cell cancer status post right nephrectomy.  Does have history of urinary calculi in her remaining kidney.  She was being followed by urology back in [place].  She had been offered previous stone extraction consents at that time were for general surveillance with regular KUBs.  Also remote  history of breast cancer treated with lumpectomy and radiation.  She has continued with annual mammography.  Not currently on any anti-hormonal therapy.    6/22/2018: Presents with 3 day history of urinary frequency urgency and burning.  Symptom onset correlated with HIDA scan performed that same day.  No fever chills.  Some noted confusion this morning.  No recent antibiotic exposures.  Does have remote history of kidney stone and stone extraction this past winter.  No recent bowel changes.  Has been going undergoing workup related to chronic epigastric discomfort and nausea.    3/22/2019: Pat presents to clinic for ER follow-up from visit earlier this week related neck pain.  She is prescribed diazepam which helped considerably with her rest.  Her  describes that her dementia especially her sundowning has substantially worsened over months.  Now is taking Risperdal twice daily.  Also takes scheduled Lorazepam 0.5 mg in the afternoon at the direction of her neurologist.  She has had wandering behavior.  Her locks have been changed which she cannot manipulate.  Her  states that rest can be quite difficult at night with her frequently being up         Review of Systems   Constitutional:  No fever, No chills.    Eye:  Negative except as documented in history of present illness.    Ear/Nose/Mouth/Throat:  Negative except as documented in history of present illness.    Respiratory:  No shortness of breath.    Cardiovascular:  No chest pain, No palpitations, No peripheral edema, No syncope.    Gastrointestinal:  No nausea, No vomiting, No heartburn, No abdominal pain.    Genitourinary:  No dysuria, No hematuria, No change in urine stream.    Hematology/Lymphatics:  Negative except as documented in history of present illness.    Endocrine:  nocturia, No excessive thirst, No polyuria.    Immunologic:  No recurrent fevers.    Musculoskeletal:  Neck pain, No joint pain, No muscle pain.    Neurologic:  Alert and  oriented X4, Confusion, No numbness, No tingling, No headache.       Health Status   Allergies:    Allergic Reactions (Selected)  Severe  Propoxyphene (Passes out)  Severity Not Documented  Opium (Passes out)  OxyCODONE (No reactions were documented)   Medications:  (Selected)   Prescriptions  Prescribed  memantine 10 mg oral tablet: = 1 tab(s), Oral, bid, # 60 tab(s), 0 Refill(s), Type: Maintenance, Pharmacy: [id number]  omeprazole 20 mg oral delayed release capsule: 1 cap(s) ( 20 mg ), PO, bid, Instructions: Take 30 minutes before meal, # 180 cap(s), 3 Refill(s), Type: Maintenance, Pharmacy: [id number], 1 cap(s) po bid,Instr:Take 30 minutes before meal  Documented Medications  Documented  [place]: po, q4 hrs, PRN: as needed for indigestion, 0 Refill(s), Type: Maintenance  Gaviscon: PRN: for indigestion, 0 Refill(s), Type: Maintenance  LORazepam 0.5 mg oral tablet: See Instructions, Instructions: 1 tab(s) Oral at 4pm, 0 Refill(s), Type: Maintenance  Pepcid Complete: 1 tab(s), po, q12 hrs, PRN: as needed for dyspepsia, 0 Refill(s), Type: Maintenance  PreserVision AREDS 2: Oral, daily, 0 Refill(s), Type: Maintenance  RisperDAL 0.25 mg oral tablet: See Instructions, Instructions: 1 tab(s) Oral .25 qam and .5mg qhs, 0 Refill(s), Type: Maintenance  acetaminophen 325 mg oral tablet: = 2 tab(s) ( 650 mg ), Oral, q4 hrs, 0 Refill(s), Type: Maintenance  diazePAM: ( 5 mg ), Oral, hs, PRN: as needed for anxiety, 0 Refill(s), Type: Maintenance  docusate-senna 50 mg-8.6 mg oral tablet: 1 tab(s), po, daily, PRN: for constipation, 0 Refill(s), Type: Maintenance  fexofenadine 180 mg oral tablet: 1 tab(s) ( 180 mg ), po, daily, PRN: as needed for allergy symptoms, 0 Refill(s), Type: Maintenance  lysine 500 mg oral tablet: = 1 tab(s) ( 500 mg ), Oral, daily, PRN: cold sores, 0 Refill(s), Type: Maintenance  ondansetron 4 mg oral tablet, disintegratin tab(s) ( 4 mg ), PO, qid, PRN: for  nausea/vomiting, 0 Refill(s), Type: Maintenance  polyethylene glycol 3350: ( 17 gm ), po, daily, 0 Refill(s), Type: Maintenance,    Medications          *denotes recorded medication          *LORazepam 0.5 mg oral tablet: See Instructions, 1 tab(s) Oral at 4pm, 0 Refill(s).          *acetaminophen 325 mg oral tablet: 650 mg, 2 tab(s), Oral, q4 hrs, 0 Refill(s).          *Gaviscon: PRN: for indigestion, 0 Refill(s).          *[place]: po, q4 hrs, PRN: as needed for indigestion, 0 Refill(s).          *Pepcid Complete: 1 tab(s), po, q12 hrs, PRN: as needed for dyspepsia, 0 Refill(s).          *diazePAM: 5 mg, Oral, hs, PRN: as needed for anxiety, 0 Refill(s).          *docusate-senna 50 mg-8.6 mg oral tablet: 1 tab(s), po, daily, PRN: for constipation, 0 Refill(s).          *fexofenadine 180 mg oral tablet: 180 mg, 1 tab(s), po, daily, PRN: as needed for allergy symptoms, 0 Refill(s).          *lysine 500 mg oral tablet: 500 mg, 1 tab(s), Oral, daily, PRN: cold sores, 0 Refill(s).          memantine 10 mg oral tablet: 1 tab(s), Oral, bid, 60 tab(s), 0 Refill(s).          *PreserVision AREDS 2: Oral, daily, 0 Refill(s).          omeprazole 20 mg oral delayed release capsule: 20 mg, 1 cap(s), PO, bid, Take 30 minutes before meal, 180 cap(s), 3 Refill(s).          *ondansetron 4 mg oral tablet, disintegratin mg, 1 tab(s), PO, qid, PRN: for nausea/vomiting, 0 Refill(s).          *polyethylene glycol 3350: 17 gm, po, daily, 0 Refill(s).          *RisperDAL 0.25 mg oral tablet: See Instructions, 1 tab(s) Oral .25 qam and .5mg qhs, 0 Refill(s).     Problem list:    All Problems  Arthritis / SNOMED CT 1475646 / Confirmed  Alzheimer's dementia with behavioral disturbance / SNOMED CT 0691739334 / Confirmed  History of breast cancer / SNOMED CT 5074239297 / Confirmed  History of nephrectomy, unilateral / SNOMED CT 5427949616 / Confirmed  Hypertension / SNOMED CT 4553167316 / Confirmed  Incontinence / SNOMED CT 99489642 /  Confirmed  Neuropathy / SNOMED CT 4664797104 / Confirmed  Vasomotor rhinitis / SNOMED CT 39362683 / Confirmed  Resolved: Breast cancer / SNOMED CT 608441077  Resolved: Nephrolithiasis / SNOMED CT 437568491  Resolved: Cancer of kidney / SNOMED CT 626198732  Canceled: History of kidney cancer / SNOMED CT 533691234      Histories   Past Medical History:    Active  Arthritis (8383847)  Alzheimer's dementia with behavioral disturbance (9632142326)  Vasomotor rhinitis (32995290)  Neuropathy (2881679580)  History of breast cancer (7060685003)  Resolved  Breast cancer (401091785):  Resolved.  Cancer of kidney (425967983):  Resolved.  Comments:  11/30/2017 CST 11:24 AM Estee Dominguez CMA  right renal cell carcinoma s/p right nephrectomy  Nephrolithiasis (693471707):  Resolved.   Family History:    No family history items have been selected or recorded.   Procedure history:    Esophagogastroduodenoscopy (676836236) on 4/17/2018 at 77 Years.  Comments:  4/26/2018 9:36 AM CDT - Mary Peguero  Indication:  Abdominal pain (failed treatment).  History of ureteroscopy and laser lithotripsy (6636749090) on 1/15/2018 at 77 Years.  Comments:  1/18/2018 5:54 PM Estee Dominguez CMA  cysto, left retrograde pyelogram, fluoroscopy for left ureteral dilation, left ureteroscoy with holmium laser lithotripsy, left ureteral stent placement  Date of last mammogram (6747052262) on 9/8/2017 at 77 Years.  Comments:  11/30/2017 12:07 PM Estee Dominguez CMA  Unilateral Diag Mammo of the right breast   Recommend a 6-month f/u BL mammo for continuation of yrly screening.  Biopsy ([id number]) on 3/23/2015 at 74 Years.  Comments:  11/29/2017 1:15 PM Reyna Rose  Of antrum.  Colonoscopy (604585691) in 2011 at 71 Years.  Comments:  11/30/2017 12:07 PM Estee Dominguez CMA  Sigmoid polyp  Lumpectomy (3084510911).  Trigger thumb (650250397).  Comments:  11/29/2017 11:42 AM KADEN - Reyna Vasquez  Surgery - both.  Cataract surgery  (282353477).  Nephrectomy (6853918203).  Comments:  2017 1:38 PM KADEN Reyna Up  Right  Total knee replacement (1453171853).  Comments:  2017 1:36 PM Reyna Rose  bilateral  Tonsillectomy (819892422).  Tubal ligation (112727545).  Radiation (025663328).   section (79809068).  Comments:  2017 1:34 PM KADEN Reyna Up  X 1  Chemotherapy ([id number]).   Social History:        Tobacco Assessment: Denies Tobacco Use            Never (less than 100 in lifetime)      Home and Environment Assessment            Marital status: .      Physical Examination   vital signs stable, as noted above   VS/Measurements   General:  Alert and oriented, No acute distress.    Eye:  Extraocular movements are intact.    HENT:  Normocephalic, Tympanic membranes are clear.    Neck:  Supple.    Respiratory:  Lungs are clear to auscultation, Respirations are non-labored.    Cardiovascular:  Normal rate, Regular rhythm, No murmur, No edema.    Gastrointestinal:  Soft, Non-tender, Non-distended.    Genitourinary:  No costovertebral angle tenderness.    Musculoskeletal:  Normal range of motion, Normal strength, No tenderness.    Neurologic:  Alert, Normal motor function, No focal deficits.    Cognition and Speech:  Speech clear and coherent.    Psychiatric:  Cooperative, Appropriate mood & affect.       Review / Management   Results review:  Lab results   2019 11:05 AM CST UA WBC Clumps Present    UA Epithelial Cells Moderate    UA WBC >100    UA RBC     UA Bacteria Moderate   2019 10:47 AM CST Culture Urine See comment   2019 10:45 AM CST UA pH 7.0    UA Specific Gravity 1.020    UA Glucose NEGATIVE    UA Bilirubin NEGATIVE    UA Ketones TRACE    U Occult Blood 1+    UA Protein 1+    UA Nitrite NEGATIVE    UA Leuk Est 2+   .       Impression and Plan   Diagnosis     Unspecified dementia without behavioral disturbance (OMK08-VG F03.90).     History of breast cancer (WON41-CR  Z85.3).     Vasomotor rhinitis (DQW01-AC J30.0).     Nocturia (LNY00-GH R35.1).           .) hypertension, controlled  current antihypertensive regimen: lisinopril 10mg daily   regimen changes: none  intolerance:  future titration/work-up plan:     .) dementia, - likely Alzheimer's vs. Lewy body; overall declining function   - following with neurology   - poor tolerance of acetylcholinesterase inhibitors   - currently on memantine 10mg BID, risperdal 0.25mg qAM and 0.5mg qPM (may need to think about titrating up)   - no longer driving   - more recent issues with wandering and sundowning - safe in home, though spouse appears to be burning out    .) h/o nephrectomy and nephrolithiasis   - following with urology    .) h/o breast cancer   - prefers to continue with annual mammography    .) health maintenance   - no further CRC or cervical cancer screening   - info on advance direct provided    RTC in 6 months

## 2022-02-16 NOTE — TELEPHONE ENCOUNTER
Entered by Uche Ragsdale CMA on September 10, 2019 11:54:06 AM CDT  ---------------------  From: Uche Ragsdale CMA   To: [facility]    Sent: 9/10/2019 11:54:06 AM CDT  Subject: Medication Management     ** Submitted: **  Order:omeprazole (omeprazole 20 mg oral delayed release capsule)  1 cap(s)  Oral  bid  Qty:  60 cap(s)        Refills:  11          Substitutions Allowed     Route To Pharmacy - [facility]    Signed by Uche Ragsdale CMA  9/10/2019 11:53:00 AM    ** Submitted: **  Complete:omeprazole (omeprazole 20 mg oral delayed release capsule)   Signed by Uche Ragsdale CMA  9/10/2019 11:54:00 AM    ** Not Approved:  **  omeprazole (OMEPRAZOLE CAP 20MG)  TAKE 1 CAPSULE BY MOUTH TWICE DAILY 30 MINUTES BEFORE A MEAL  Qty:  28 EA        Days Supply:  14        Refills:  10          Substitutions Allowed     Route To Pharmacy - [facility]   Note from Pharmacy:  Please authorize a 30 day supply with 11 refills. Please note, MMP will default to a 30 day supply whenever refills are provided  Signed by Uche Ragsdale CMA            ** Patient matched by Uche Ragsdale CMA on 9/10/2019 11:52:56 AM CDT **      ------------------------------------------  From: [facility]  To: Garrison Lopez MD  Sent: September 9, 2019 10:22:12 AM CDT  Subject: Medication Management  Due: September 10, 2019 10:22:12 AM CDT    ** On Hold Pending Signature **  Drug: omeprazole (omeprazole 20 mg oral delayed release capsule)  TAKE 1 CAPSULE BY MOUTH TWICE DAILY 30 MINUTES BEFORE A MEAL  Quantity: 12 EA       Days Supply: 6         Refills: 10  Substitutions Allowed  Notes from Pharmacy:     Dispensed Drug: omeprazole (omeprazole 20 mg oral delayed release capsule)  TAKE 1 CAPSULE BY MOUTH TWICE DAILY 30 MINUTES BEFORE A MEAL  Quantity: 28 EA       Days Supply: 14        Refills: 10  Substitutions Allowed  Notes from Pharmacy: Please authorize a 30 day supply with 11  refills. Please note, MMP will default to a 30 day supply whenever refills are provided  ------------------------------------------

## 2022-02-16 NOTE — PROGRESS NOTES
Patient:   SURINDER VARGAS            MRN: [id number]            FIN: [id number]               Age:   77 years     Sex:  Female     :  1940   Associated Diagnoses:   Unspecified dementia without behavioral disturbance; History of breast cancer; Vasomotor rhinitis; Nocturia   Author:   Garrison Lopez MD      Visit Information      Date of Service: 2017 01:00 pm  Performing Location: [facility]  Encounter#: [id number]      Primary Care Provider (PCP):  97 -UNKNOWN,      Referring Provider:  Garrison Lopez MD    NPI# [id number]      Chief Complaint   2017 1:33 PM CST   Establish care              Additional Information:No additional information recorded during visit.   Chief complaint and symptoms as noted above and confirmed with patient.  Recent lab and diagnostic studies reviewed with patient      History of Present Illness   2017: Rosalie presents to clinic to establish care.  She is the mother of Mary Hargrove.  She and her , Charlie, recently relocated from [place] to [place].  Still trying to get acquainted to the area.  They are living in a town home.  No troubles with living independently.  Rosalie has dealt with dementia over the past several years.  Currently maintained on memantine.  It sounds like she was previously trialed on acetylcholinesterase inhibitor and generally did not tolerate well.  She is previous neurology assessment ruling out alternative forms of dementia.  She no longer drives.  No issues with behavioral disturbances or hallucinations.  Sounds like she and Charlie have fared well as a team.  No decisional incapacity or activated power of  previously.  She has a previous history of renal cell cancer status post right nephrectomy.  Does have history of urinary calculi in her remaining kidney.  She was being followed by urology back in [place].  She had been offered previous stone extraction consents at that time were for general surveillance with  regular KUBs.  Also remote history of breast cancer treated with lumpectomy and radiation.  She has continued with annual mammography.  Not currently on any anti-hormonal therapy.         Review of Systems   Constitutional:  No fever, No chills.    Eye:  Negative except as documented in history of present illness.    Ear/Nose/Mouth/Throat:  Negative except as documented in history of present illness.    Respiratory:  No shortness of breath.    Cardiovascular:  No chest pain, No palpitations, No peripheral edema, No syncope.    Gastrointestinal:  No nausea, No vomiting, No abdominal pain.    Genitourinary:  No dysuria, No hematuria.    Hematology/Lymphatics:  Negative except as documented in history of present illness.    Endocrine:  nocturia, No excessive thirst, No polyuria.    Immunologic:  No recurrent fevers.    Musculoskeletal:  No joint pain, No muscle pain.    Neurologic:  Alert and oriented X4, No numbness, No tingling, No headache.       Health Status   Allergies:    Allergic Reactions (Selected)  Severe  Propoxyphene (Passes out)  Severity Not Documented  Opium (Passes out)  OxyCODONE (No reactions were documented)   Medications:  (Selected)   Documented Medications  Documented  [place]: po, q4 hrs, PRN: as needed for indigestion, 0 Refill(s), Type: Maintenance  Cogni Shield: Cogni Shield, See Instructions, Instructions: BID for memory, Supply, 0 Refill(s), Type: Maintenance  Gaviscon: PRN: for indigestion, 0 Refill(s), Type: Maintenance  Pepcid Complete: 1 tab(s), po, q12 hrs, 0 Refill(s), Type: Maintenance  Tylenol PM: ( 325 mg ), po, hs, PRN: as needed for sleep, 0 Refill(s), Type: Maintenance  docusate-senna 50 mg-8.6 mg oral tablet: 1 tab(s), po, daily, PRN: for constipation, 0 Refill(s), Type: Maintenance  fexofenadine 180 mg oral tablet: 1 tab(s) ( 180 mg ), po, daily, 0 Refill(s), Type: Maintenance  lysine 500 mg oral tablet: 2 tab(s) ( 1,000 mg ), po, daily, 0 Refill(s), Type:  Maintenance  memantine 10 mg oral tablet: 1 tab(s) ( 10 mg ), po, bid, 0 Refill(s), Type: Maintenance  omeprazole 20 mg oral delayed release capsule: 1 cap(s) ( 20 mg ), PO, Daily, # 90 cap(s), 0 Refill(s), Type: Maintenance  ondansetron 4 mg oral tablet, disintegratin tab(s) ( 4 mg ), PO, qid, PRN: for nausea/vomiting, 0 Refill(s), Type: Maintenance  polyethylene glycol 3350: ( 17 gm ), po, daily, 0 Refill(s), Type: Maintenance   Problem list:    All Problems  Arthritis / SNOMED CT 0683246 / Confirmed  Unspecified dementia without behavioral disturbance / SNOMED CT 22024381 / Confirmed  History of breast cancer / SNOMED CT 0241470733 / Confirmed  Neuropathy / SNOMED CT 6801982790 / Confirmed  Vasomotor rhinitis / SNOMED CT 19688320 / Confirmed  Resolved: Breast cancer / SNOMED CT 561013241  Resolved: Cancer of kidney / SNOMED CT 510544629  Canceled: History of kidney cancer / SNOMED CT 172978742      Histories   Past Medical History:    Active  Arthritis (5533358)  Unspecified dementia without behavioral disturbance (50421615)  Vasomotor rhinitis (22100824)  Neuropathy (8600470856)  History of breast cancer (3849773545)  Resolved  Breast cancer (510162241):  Resolved.  Cancer of kidney (346357621):  Resolved.  Comments:  2017 CST 11:24 AM CST - Estee Lemos CMA  right renal cell carcinoma s/p right nephrectomy   Family History:    No family history items have been selected or recorded.   Procedure history:    Date of last mammogram (4149670555) on 2017 at 77 Years.  Comments:  2017 12:07 PM - Estee Lemos CMA  Unilateral Diag Mammo of the right breast   Recommend a 6-month f/u BL mammo for continuation of yrly screening.  Biopsy ([id number]) on 3/23/2015 at 74 Years.  Comments:  2017 1:15 PM - Reyna Vasquez  Of antrum.  Colonoscopy (412981094) in  at 71 Years.  Comments:  2017 12:07 PM - Estee Lemos CMA  Sigmoid polyp  Lumpectomy (1530072612).  Trigger thumb  (880684289).  Comments:  2017 11:42 AM - Reyna Vasquez  Surgery - both.  Cataract surgery (581817892).  Nephrectomy (0616382632).  Comments:  2017 1:38 PM - Reyna Vasquez  Right  Total knee replacement (1036927638).  Comments:  2017 1:36 PM - Reyna Vasquez  bilateral  Tonsillectomy (889187223).  Tubal ligation (195234193).  Radiation (565729241).   section (75055035).  Comments:  2017 1:34 PM - Reyna Vasquez  X 1  Chemotherapy ([id number]).   Social History:        Tobacco Assessment: Denies Tobacco Use            Never (less than 100 in lifetime)      Home and Environment Assessment            Marital status: .        Physical Examination   vital signs stable, as noted above   Vital Signs   2017 1:33 PM CST Temperature Tympanic 98.7 DegF    Peripheral Pulse Rate 79 bpm    Systolic Blood Pressure 150 mmHg  HI    Diastolic Blood Pressure 90 mmHg    Mean Arterial Pressure 110 mmHg      Measurements from flowsheet : Measurements   2017 1:33 PM CST Height Measured - Standard 62.5 in    Weight Measured - Standard 156.2 lb    BSA 1.77 m2    Body Mass Index 28.11 kg/m2      General:  Alert and oriented, No acute distress.    Eye:  Extraocular movements are intact.    HENT:  Normocephalic, Tympanic membranes are clear, Oral mucosa is moist.    Neck:  Supple, No carotid bruit, No jugular venous distention, No lymphadenopathy.    Respiratory:  Lungs are clear to auscultation, Respirations are non-labored.    Cardiovascular:  Normal rate, Regular rhythm, No murmur, No edema.    Gastrointestinal:  Soft, Non-tender, Non-distended, Normal bowel sounds, No organomegaly.    Musculoskeletal:  Normal range of motion, Normal strength, No tenderness.    Neurologic:  Alert, Oriented, Normal motor function, No focal deficits.    Cognition and Speech:  Oriented, Speech clear and coherent.    Psychiatric:  Appropriate mood & affect.       Impression and Plan   Diagnosis     Unspecified  dementia without behavioral disturbance (FOP78-RF F03.90).     History of breast cancer (MPE46-JI Z85.3).     Vasomotor rhinitis (JVS71-XJ J30.0).     Nocturia (SYO05-US R35.1).       .) dementia, mild late onset - likely Alzheimer's   - previous assessment by neurology   - poor tolerance of acetylcholinesterase inhibitors   - currently on memantine 10mg BID   - no longer driving   - no issues with ADLs or living independently    .) nocturia   - previously maintained on desmopressin - does not sound like it helps   - advised to stop given associated hyponatremia and lack of efficacy   - advised to eliminate drinking in the evening after dinner; general avoidance of caffeine, especially in the evenings    .) h/o nephrectomy and nephrolithiasis   - previously seeing urology; will get her established here locally    .) h/o breast cancer   - prefers to continue with annual mammography    .) health maintenance   - no further CRC or cervical cancer screening   - info on advance direct provided    RTC in 5 months; BMP

## 2022-02-16 NOTE — PROGRESS NOTES
Patient:   SURINDER VARGAS            MRN: [id number]            FIN: [id number]               Age:   77 years     Sex:  Female     :  1940   Associated Diagnoses:   Unspecified dementia without behavioral disturbance; History of breast cancer; Vasomotor rhinitis; Nocturia   Author:   Garrison Lopez MD      Visit Information      Date of Service: 02/15/2018 11:41 am  Performing Location: [facility]  Encounter#: [id number]      Primary Care Provider (PCP):  [id number] -UNKNOWN,      Referring Provider:  Garrison Lopez MD    NPI# [id number]      Chief Complaint   2/15/2018 11:57 AM CST   f/u HTN              Additional Information:No additional information recorded during visit.   Chief complaint and symptoms as noted above and confirmed with patient.  Recent lab and diagnostic studies reviewed with patient      History of Present Illness   2017: Rosalie presents to clinic to establish care.  She is the mother of Mayr Hargrove.  She and her , Charlie, recently relocated from [place] to [place].  Still trying to get acquainted to the area.  They are living in a town home.  No troubles with living independently.  Rosalie has dealt with dementia over the past several years.  Currently maintained on memantine.  It sounds like she was previously trialed on acetylcholinesterase inhibitor and generally did not tolerate well.  She is previous neurology assessment ruling out alternative forms of dementia.  She no longer drives.  No issues with behavioral disturbances or hallucinations.  Sounds like she and Charlie have fared well as a team.  No decisional incapacity or activated power of  previously.  She has a previous history of renal cell cancer status post right nephrectomy.  Does have history of urinary calculi in her remaining kidney.  She was being followed by urology back in [place].  She had been offered previous stone extraction consents at that time were for general surveillance with regular  KUBs.  Also remote history of breast cancer treated with lumpectomy and radiation.  She has continued with annual mammography.  Not currently on any anti-hormonal therapy.    2/15/2018: Pat returns to clinic at the instruction Dr. Roland for blood pressure follow-up.  In clinic blood pressures consistently in the 150s.  Currently not maintain any antihypertensive medication.  Brings up concerns related to persistent morning nausea; generally does improve after eating.  Maintained on omeprazole daily.  Not currently taking any evening Pepcid.  Bring up concerns related to persistent memory decline         Review of Systems   Constitutional:  No fever, No chills.    Eye:  Negative except as documented in history of present illness.    Ear/Nose/Mouth/Throat:  Negative except as documented in history of present illness.    Respiratory:  No shortness of breath.    Cardiovascular:  No chest pain, No palpitations, No peripheral edema, No syncope.    Gastrointestinal:  No nausea, No vomiting, No abdominal pain.    Genitourinary:  No dysuria, No hematuria.    Hematology/Lymphatics:  Negative except as documented in history of present illness.    Endocrine:  nocturia, No excessive thirst, No polyuria.    Immunologic:  No recurrent fevers.    Musculoskeletal:  No joint pain, No muscle pain.    Neurologic:  Alert and oriented X4, No numbness, No tingling, No headache.       Health Status   Allergies:    Allergic Reactions (Selected)  Severe  Propoxyphene (Passes out)  Severity Not Documented  Opium (Passes out)  OxyCODONE (No reactions were documented)   Medications:  (Selected)   Prescriptions  Prescribed  lisinopril 10 mg oral tablet: 1 tab(s) ( 10 mg ), PO, Daily, # 90 tab(s), 3 Refill(s), Type: Maintenance, Pharmacy: [id number], 1 tab(s) po daily  memantine 10 mg oral tablet: See Instructions, Instructions: TAKE 1 TABLET BY MOUTH TWICE DAILY, # 180 tab(s), Type: Soft Stop, Pharmacy: [facility]  [id number], TAKE 1 TABLET BY MOUTH TWICE DAILY  Documented Medications  Documented  Cristy-[place]: po, q4 hrs, PRN: as needed for indigestion, 0 Refill(s), Type: Maintenance  Gaviscon: PRN: for indigestion, 0 Refill(s), Type: Maintenance  Pepcid Complete: 1 tab(s), po, q12 hrs, 0 Refill(s), Type: Maintenance  docusate-senna 50 mg-8.6 mg oral tablet: 1 tab(s), po, daily, PRN: for constipation, 0 Refill(s), Type: Maintenance  fexofenadine 180 mg oral tablet: 1 tab(s) ( 180 mg ), po, daily, 0 Refill(s), Type: Maintenance  lysine 500 mg oral tablet: 2 tab(s) ( 1,000 mg ), po, daily, 0 Refill(s), Type: Maintenance  omeprazole 20 mg oral delayed release capsule: 1 cap(s) ( 20 mg ), PO, Daily, # 90 cap(s), 0 Refill(s), Type: Maintenance  ondansetron 4 mg oral tablet, disintegratin tab(s) ( 4 mg ), PO, qid, PRN: for nausea/vomiting, 0 Refill(s), Type: Maintenance  polyethylene glycol 3350: ( 17 gm ), po, daily, 0 Refill(s), Type: Maintenance   Problem list:    All Problems  Arthritis / SNOMED CT 7513898 / Confirmed  Unspecified dementia without behavioral disturbance / SNOMED CT 32265768 / Confirmed  History of breast cancer / SNOMED CT 8277356760 / Confirmed  History of nephrectomy, unilateral / SNOMED CT 4321375353 / Confirmed  Nephrolithiasis / SNOMED CT 626805357 / Confirmed  Neuropathy / SNOMED CT 0221521392 / Confirmed  Vasomotor rhinitis / SNOMED CT 85225784 / Confirmed  Resolved: Breast cancer / SNOMED CT 459320343  Resolved: Cancer of kidney / SNOMED CT 817444883  Canceled: History of kidney cancer / SNOMED CT 119690051      Histories   Past Medical History:    Active  Arthritis (0952505)  Unspecified dementia without behavioral disturbance (36656978)  Vasomotor rhinitis (39240512)  Neuropathy (2965313141)  History of breast cancer (9334286937)  Resolved  Breast cancer (712169248):  Resolved.  Cancer of kidney (004069286):  Resolved.  Comments:  2017 CST 11:24 AM KADEN - Estee Lemos CMA  right renal cell  carcinoma s/p right nephrectomy   Family History:    No family history items have been selected or recorded.   Procedure history:    History of ureteroscopy and laser lithotripsy (9620430708) on 1/15/2018 at 77 Years.  Comments:  2018 5:54 PM - Estee Lemos CMA  cysto, left retrograde pyelogram, fluoroscopy for left ureteral dilation, left ureteroscoy with holmium laser lithotripsy, left ureteral stent placement  Date of last mammogram (9051760819) on 2017 at 77 Years.  Comments:  2017 12:07 PM - Esete Lemos CMA  Unilateral Diag Mammo of the right breast   Recommend a 6-month f/u BL mammo for continuation of yrly screening.  Biopsy ([id number]) on 3/23/2015 at 74 Years.  Comments:  2017 1:15 PM - Reyna Vasquez  Of antrum.  Colonoscopy (918485391) in  at 71 Years.  Comments:  2017 12:07 PM - Estee Lemos CMA  Sigmoid polyp  Lumpectomy (5688527469).  Trigger thumb (247146890).  Comments:  2017 11:42 AM - Reyna Vasquez  Surgery - both.  Cataract surgery (190404438).  Nephrectomy (7073208614).  Comments:  2017 1:38 PM - Reyna Vasquez  Right  Total knee replacement (7233736219).  Comments:  2017 1:36 PM - Reyna Vasquez  bilateral  Tonsillectomy (349243984).  Tubal ligation (499866261).  Radiation (369676668).   section (04969380).  Comments:  2017 1:34 PM - Reyna Vasquez  X 1  Chemotherapy (591391851).   Social History:        Tobacco Assessment: Denies Tobacco Use            Never (less than 100 in lifetime)      Home and Environment Assessment            Marital status: .        Physical Examination   vital signs stable, as noted above   Vital Signs   2/15/2018 11:57 AM CST Temperature Tympanic 98.6 DegF    Peripheral Pulse Rate 80 bpm    Systolic Blood Pressure 152 mmHg  HI    Diastolic Blood Pressure 92 mmHg  HI    Mean Arterial Pressure 112 mmHg    BP Site Right arm    BP Systolic Repeat 153 mmHg    BP Diastolic Repeat 89 mmHg       Measurements from flowsheet : Measurements   2/15/2018 11:57 AM CST   Weight Measured - Standard                154.8 lb     General:  Alert and oriented, No acute distress.    Eye:  Extraocular movements are intact.    HENT:  Normocephalic, Tympanic membranes are clear, Oral mucosa is moist.    Neck:  Supple, No lymphadenopathy.    Respiratory:  Lungs are clear to auscultation, Respirations are non-labored.    Cardiovascular:  Normal rate, Regular rhythm, No murmur, No edema.    Gastrointestinal:  Soft, Non-tender.    Musculoskeletal:  Normal range of motion, Normal strength, No tenderness.    Neurologic:  Alert, Oriented, Normal motor function, No focal deficits.    Cognition and Speech:  Oriented, Speech clear and coherent.    Psychiatric:  Appropriate mood & affect.       Review / Management   Results review:  Lab results   1/9/2018 11:24 AM CST Sodium Level 139 mmol/L    Potassium Level 4.3 mmol/L    Chloride Level 104 mmol/L    CO2 Level 27 mmol/L    Glucose Level 94 mg/dL    BUN 18 mg/dL    Creatinine 1.16 mg/dL  HI    BUN/Creat Ratio 16    eGFR 45 mL/min/1.73m2  LOW    eGFR African American 52 mL/min/1.73m2  LOW    Calcium Level 9.5 mg/dL    WBC 6.0    RBC 4.35    Hgb 13.6 gm/dL    Hct 39.5 %    MCV 90.8 fL    MCH 31.3 pg    MCHC 34.4 gm/dL    RDW 12.2 %    Platelet 228    MPV 9.8 fL   .       Impression and Plan   Diagnosis     Unspecified dementia without behavioral disturbance (XTU53-NB F03.90).     History of breast cancer (CBY03-HF Z85.3).     Vasomotor rhinitis (KHV60-MV J30.0).     Nocturia (ZTK74-GR R35.1).         .) hypertension, uncontrolled  current antihypertensive regimen: none  regimen changes: will begin on lisinopril 10mg daily   intolerance:  future titration/work-up plan:     .) morning nausea; suspect GERD component   - on omeprazole 20mg daily    - advised using Pepcid qhs prn    .) dementia, mild late onset - likely Alzheimer's   - previous assessment by neurology   - poor tolerance  of acetylcholinesterase inhibitors   - currently on memantine 10mg BID   - no longer driving   - no issues with ADLs or living independently    .) h/o nephrectomy and nephrolithiasis   - following with urology    .) h/o breast cancer   - prefers to continue with annual mammography    .) health maintenance   - no further CRC or cervical cancer screening   - info on advance direct provided    RTC as previously scheduled

## 2022-02-16 NOTE — TELEPHONE ENCOUNTER
---------------------  From: Mamie Valero CMA (Phone Messages Pool ([id number]_[facility]))   To: Garrison Lopez MD;     Sent: 5/7/2019 9:27:44 AM CDT  Subject: PT FYI     Phone Message    PCP:   JOSIAH      Time of Call:  0855       Person Calling:  Tomeka Barnes, physical therapist at [facility]  Phone number:  [phone], ok to LM    Returned call at: [phone]    Note:   Pt recently moved to new assisted living and  is concerned that pt is not moving and her balance is worse. Home PT eval done on Sunday and Tomeka is recommending continuing PT to work on balance and strength.     Recommended schedule:  three times per week x 1 week  two times per week x 5 weeks  once weekly x 2 weeks    I gave verbal ok and sending you FYI.     Last office visit and reason:  4/4/19 dementia/anorexia JOSIAH

## 2022-02-16 NOTE — PROGRESS NOTES
Patient:   SURINDER VARGAS            MRN: [id number]            FIN: [id number]               Age:   78 years     Sex:  Female     :  1940   Associated Diagnoses:   Acute UTI; Alzheimer's dementia with behavioral disturbance   Author:   Brandon Almeida MD      Visit Information   Visit type:  New symptom.    Accompanied by:  Family member, Spouse.    Source of history:  Self, Family member, Spouse.    History limitation:  Clinical condition.       Chief Complaint   2019 10:58 AM CST    possible uti, urgency,frequency, dysuria        History of Present Illness             The patient presents with dysuria.  The dysuria is characterized by burning.  The severity of the dysuria is mild.  The dysuria has lasted for 1 day(s).  The context of the dysuria: occurred not after intercourse, not in association with illness, not in association with pregnancy and not in association with intrauterine device.  Frequency for one week.  There are no modifying factors.  Associated symptoms consist of urinary frequency, urinary urgency, denies abdominal pain, denies chills, denies fever, denies flank pain, denies hematuria, denies pelvic pain and denies urinary hesitancy.        Review of Systems   Constitutional:  No fever, No chills.    Gastrointestinal:  Negative.    Genitourinary:  Negative except as documented in history of present illness.    Gynecologic:  No intermenstrual bleeding.    Neurologic:  No increased confusion or falls.       Health Status   Allergies:    Allergic Reactions (Selected)  Severe  Propoxyphene (Passes out)  Severity Not Documented  Opium (Passes out)  OxyCODONE (No reactions were documented)   Medications:  (Selected)   Prescriptions  Prescribed  Keflex 500 mg oral capsule: = 1 cap(s) ( 500 mg ), PO, qid, # 20 cap(s), 0 Refill(s), Type: Maintenance, Pharmacy: [id number], 1 cap(s) Oral qid,x5 day(s)  lisinopril 10 mg oral tablet: = 1 tab(s), Oral, daily, # 30 tab(s), 0  Refill(s), Type: Maintenance, Pharmacy: [id number], Needs appt for further refills  memantine 10 mg oral tablet: = 1 tab(s), Oral, bid, # 60 tab(s), 0 Refill(s), Type: Maintenance, Pharmacy: [facility] [id number], Needs appt for further refills  omeprazole 20 mg oral delayed release capsule: 1 cap(s) ( 20 mg ), PO, bid, Instructions: Take 30 minutes before meal, # 180 cap(s), 3 Refill(s), Type: Maintenance, Pharmacy: [id number], 1 cap(s) po bid,Instr:Take 30 minutes before meal  Documented Medications  Documented  ALPRAZolam 0.5 mg oral tablet: = 1 tab(s) ( 0.5 mg ), PO, hs, PRN: for anxiety, 0 Refill(s), Type: Maintenance  Cristy-[place]: po, q4 hrs, PRN: as needed for indigestion, 0 Refill(s), Type: Maintenance  Gaviscon: PRN: for indigestion, 0 Refill(s), Type: Maintenance  Pepcid Complete: 1 tab(s), po, q12 hrs, PRN: as needed for dyspepsia, 0 Refill(s), Type: Maintenance  RisperDAL 0.25 mg oral tablet: = 1 tab(s) ( 0.25 mg ), Oral, bid, 0 Refill(s), Type: Maintenance  docusate-senna 50 mg-8.6 mg oral tablet: 1 tab(s), po, daily, PRN: for constipation, 0 Refill(s), Type: Maintenance  fexofenadine 180 mg oral tablet: 1 tab(s) ( 180 mg ), po, daily, PRN: as needed for allergy symptoms, 0 Refill(s), Type: Maintenance  ondansetron 4 mg oral tablet, disintegratin tab(s) ( 4 mg ), PO, qid, PRN: for nausea/vomiting, 0 Refill(s), Type: Maintenance  polyethylene glycol 3350: ( 17 gm ), po, daily, 0 Refill(s), Type: Maintenance   Problem list:    All Problems  Alzheimer's dementia with behavioral disturbance / SNOMED CT 5479467842 / Confirmed  Arthritis / SNOMED CT 1692565 / Confirmed  History of breast cancer / SNOMED CT 0314480786 / Confirmed  History of nephrectomy, unilateral / SNOMED CT 9515566487 / Confirmed  Hypertension / SNOMED CT 1455825023 / Confirmed  Incontinence / SNOMED CT 60620647 / Confirmed  Neuropathy / SNOMED CT 5071610486 / Confirmed  Vasomotor rhinitis / SNOMED  CT 06914356 / Confirmed  Resolved: Breast cancer / SNOMED CT 801830707  Resolved: Cancer of kidney / SNOMED CT 562218633  Resolved: Nephrolithiasis / SNOMED CT 964520119  Canceled: History of kidney cancer / SNOMED CT 080924491      Histories   Past Medical History:    Active  Arthritis (1409475)  Alzheimer's dementia with behavioral disturbance (0601270293)  Vasomotor rhinitis (90720682)  Neuropathy (7934132798)  History of breast cancer (0155250658)  Resolved  Breast cancer (149252635):  Resolved.  Cancer of kidney (618515752):  Resolved.  Comments:  11/30/2017 CST 11:24 AM Estee Dominguez CMA  right renal cell carcinoma s/p right nephrectomy  Nephrolithiasis (182995850):  Resolved.   Family History:    No family history items have been selected or recorded.   Procedure history:    Esophagogastroduodenoscopy ([id number]) on 4/17/2018 at 77 Years.  Comments:  4/26/2018 9:36 AM CDT - Mary Peguero  Indication:  Abdominal pain (failed treatment).  History of ureteroscopy and laser lithotripsy (6637273471) on 1/15/2018 at 77 Years.  Comments:  1/18/2018 5:54 PM Estee Dominguez CMA  cysto, left retrograde pyelogram, fluoroscopy for left ureteral dilation, left ureteroscoy with holmium laser lithotripsy, left ureteral stent placement  Date of last mammogram (4755937698) on 9/8/2017 at 77 Years.  Comments:  11/30/2017 12:07 PM Estee Dominguez CMA  Unilateral Diag Mammo of the right breast   Recommend a 6-month f/u BL mammo for continuation of yrly screening.  Biopsy ([id number]) on 3/23/2015 at 74 Years.  Comments:  11/29/2017 1:15 PM Reyna Rose  Of antrum.  Colonoscopy (280853345) in 2011 at 71 Years.  Comments:  11/30/2017 12:07 PM Estee Dominguez CMA  Sigmoid polyp  Lumpectomy (8817857338).  Trigger thumb (849768188).  Comments:  11/29/2017 11:42 AM Reyna Rose  Surgery - both.  Cataract surgery (333523466).  Nephrectomy (9755751844).  Comments:  11/29/2017 1:38 PM KADEN - Pedro  Reyna  Right  Total knee replacement (7003383436).  Comments:  2017 1:36 PM CST - Reyna Vasquez  bilateral  Tonsillectomy (307807965).  Tubal ligation (876678160).  Radiation (966796006).   section (41320570).  Comments:  2017 1:34 PM CST - Reyna Vasquez  X 1  Chemotherapy (297299708).   Social History:        Tobacco Assessment: Denies Tobacco Use            Never (less than 100 in lifetime)      Home and Environment Assessment            Marital status: .      Physical Examination   Vital Signs   2019 10:58 AM CST Temperature Tympanic 98.7 DegF    Peripheral Pulse Rate 74 bpm    Systolic Blood Pressure 130 mmHg    Diastolic Blood Pressure 90 mmHg  HI    Mean Arterial Pressure 103 mmHg    Oxygen Saturation 96 %      Measurements from flowsheet : Measurements   2019 10:58 AM CST    Weight Measured - Standard                159.08 lb     General:  Alert and oriented, No acute distress.    Eye:  Normal conjunctiva.    HENT:  Normocephalic.    Respiratory:  Lungs are clear to auscultation, Respirations are non-labored.    Cardiovascular:  Normal rate, Regular rhythm.    Gastrointestinal:  Soft, Non-tender, Non-distended.    Genitourinary:  No costovertebral angle tenderness.       Review / Management   Results review:  Lab results   2019 11:05 AM CST UA WBC Clumps Present    UA Epithelial Cells Moderate    UA WBC >100    UA RBC     UA Bacteria Moderate   2019 10:45 AM CST UA pH 7.0    UA Specific Gravity 1.020    UA Glucose NEGATIVE    UA Bilirubin NEGATIVE    UA Ketones TRACE    Urine Occult Blood 1+    UA Protein 1+    UA Nitrite NEGATIVE    UA Leukocyte Esterase 2+   .       Impression and Plan   Diagnosis     Acute UTI (JXE38-EH N39.0).     Course:  Not improving.    Orders     Orders (Selected)   Outpatient Orders  Ordered (In Transit)  Culture, Urine, Routine* ([facility]): Specimen Type: Urine (Clean Catch), Collection Date: 19 10:37:00  CST  Prescriptions  Prescribed  Keflex 500 mg oral capsule: = 1 cap(s) ( 500 mg ), PO, qid, # 20 cap(s), 0 Refill(s), Type: Maintenance, Pharmacy: [facility], 1 cap(s) Oral qid,x5 day(s).     Return if not better.     Diagnosis     Alzheimer's dementia with behavioral disturbance (QCT24-NB G30.9).     Course:  Progressing as expected.

## 2022-02-16 NOTE — PROGRESS NOTES
Patient:   SURINDER VARGAS            MRN: [id number]            FIN: [id number]               Age:   78 years     Sex:  Female     :  1940   Associated Diagnoses:   Injury of foot, left; Incontinence; Immunization due; Alzheimer's dementia with behavioral disturbance   Author:   Brandon Almeida MD      Visit Information   Visit type:  Acute visit.    Accompanied by:  Family member, Spouse.    Source of history:  Self, Family member, Spouse.    History limitation:  Clinical condition.       Chief Complaint   10/11/2018 1:32 PM CDT   stumbled and fell, left foot,      History of Present Illness    The patient is here with her  and daughter.  She is a woman with dementia who walked out of the house at 2:30 in the morning without shoes in her nightclothes and was returned by neighbors and the police.  She did this one other time when they were staying in a hotel.  She typically sleeps through the night.  She has been having more hallucination, usually of children, over the past week or two.  She was incontinent last night and is not typically incontinent.  No dysuria, frequency, urgency, fever, cough, or other signs of illness.  Her  increased the Risperdal 0.25 mg that she takes daily to b.i.d. as her neurologist instructed that they could do because she was having more hallucination.  No other medication changes.     Her  has arranged for an upgrade on their locks such that the patient will not be able to get out of the house without a key to keep her from wandering.  I also discussed with her  and family where to get some resources for care of patients with dementia.            Review of Systems   Constitutional:  No fever, No chills.    Respiratory:  No shortness of breath, No cough.    Cardiovascular:  No chest pain, No palpitations, No peripheral edema.    Gastrointestinal:  No nausea, No vomiting, No diarrhea.    Genitourinary:  Negative except as documented in history of  present illness.    Musculoskeletal:  Negative except as documented in history of present illness.    Neurologic:  Negative except as documented in history of present illness.    Psychiatric:  Negative except as documented in history of present illness.       Health Status   Allergies:    Allergic Reactions (Selected)  Severe  Propoxyphene (Passes out)  Severity Not Documented  Opium (Passes out)  OxyCODONE (No reactions were documented)   Medications:  (Selected)   Prescriptions  Prescribed  lisinopril 10 mg oral tablet: 1 tab(s) ( 10 mg ), PO, Daily, # 90 tab(s), 3 Refill(s), Type: Maintenance, Pharmacy: [id number], 1 tab(s) po daily  memantine 10 mg oral tablet: See Instructions, Instructions: TAKE 1 TABLET BY MOUTH TWICE DAILY, # 180 tab(s), 1 Refill(s), Type: Soft Stop, Pharmacy: [id number]  omeprazole 20 mg oral delayed release capsule: 1 cap(s) ( 20 mg ), PO, bid, Instructions: Take 30 minutes before meal, # 180 cap(s), 3 Refill(s), Type: Maintenance, Pharmacy: [id number], 1 cap(s) po bid,Instr:Take 30 minutes before meal  Documented Medications  Documented  [place]: po, q4 hrs, PRN: as needed for indigestion, 0 Refill(s), Type: Maintenance  Gaviscon: PRN: for indigestion, 0 Refill(s), Type: Maintenance  Pepcid Complete: 1 tab(s), po, q12 hrs, PRN: as needed for dyspepsia, 0 Refill(s), Type: Maintenance  RisperDAL 0.25 mg oral tablet: = 1 tab(s) ( 0.25 mg ), Oral, bid, 0 Refill(s), Type: Maintenance  docusate-senna 50 mg-8.6 mg oral tablet: 1 tab(s), po, daily, PRN: for constipation, 0 Refill(s), Type: Maintenance  fexofenadine 180 mg oral tablet: 1 tab(s) ( 180 mg ), po, daily, PRN: as needed for allergy symptoms, 0 Refill(s), Type: Maintenance  ondansetron 4 mg oral tablet, disintegratin tab(s) ( 4 mg ), PO, qid, PRN: for nausea/vomiting, 0 Refill(s), Type: Maintenance  polyethylene glycol 3350: ( 17 gm ), po, daily, 0 Refill(s), Type: Maintenance,     Medications          *denotes recorded medication          *Gaviscon: PRN: for indigestion, 0 Refill(s).          *[place]risty-[place]: po, q4 hrs, PRN: as needed for indigestion, 0 Refill(s).          *Pepcid Complete: 1 tab(s), po, q12 hrs, PRN: as needed for dyspepsia, 0 Refill(s).          *docusate-senna 50 mg-8.6 mg oral tablet: 1 tab(s), po, daily, PRN: for constipation, 0 Refill(s).          *fexofenadine 180 mg oral tablet: 180 mg, 1 tab(s), po, daily, PRN: as needed for allergy symptoms, 0 Refill(s).          lisinopril 10 mg oral tablet: 10 mg, 1 tab(s), PO, Daily, 90 tab(s), 3 Refill(s).          memantine 10 mg oral tablet: See Instructions, TAKE 1 TABLET BY MOUTH TWICE DAILY, 180 tab(s), 1 Refill(s).          omeprazole 20 mg oral delayed release capsule: 20 mg, 1 cap(s), PO, bid, Take 30 minutes before meal, 180 cap(s), 3 Refill(s).          *ondansetron 4 mg oral tablet, disintegratin mg, 1 tab(s), PO, qid, PRN: for nausea/vomiting, 0 Refill(s).          *polyethylene glycol 3350: 17 gm, po, daily, 0 Refill(s).          *RisperDAL 0.25 mg oral tablet: 0.25 mg, 1 tab(s), Oral, bid, 0 Refill(s).     Problem list:    All Problems  Alzheimer's dementia with behavioral disturbance / SNOMED CT 7903768631 / Confirmed  Arthritis / SNOMED CT 6873857 / Confirmed  History of breast cancer / SNOMED CT 3196397327 / Confirmed  History of nephrectomy, unilateral / SNOMED CT 3923387325 / Confirmed  Hypertension / SNOMED CT 5578213717 / Confirmed  Incontinence / SNOMED CT 01526587 / Confirmed  Neuropathy / SNOMED CT 4270673708 / Confirmed  Vasomotor rhinitis / SNOMED CT 71090275 / Confirmed  Resolved: Breast cancer / SNOMED CT 687289287  Resolved: Cancer of kidney / SNOMED CT 371008646  Resolved: Nephrolithiasis / SNOMED CT 749067487  Canceled: History of kidney cancer / SNOMED CT 694134646      Histories   Past Medical History:    Active  Arthritis (3124127)  Alzheimer's dementia with behavioral disturbance  (6011257893)  Vasomotor rhinitis (73487467)  Neuropathy (2903976428)  History of breast cancer (9518873107)  Resolved  Breast cancer (415477083):  Resolved.  Cancer of kidney (352365530):  Resolved.  Comments:  2017 CST 11:24 AM CST - Estee Lemos CMA  right renal cell carcinoma s/p right nephrectomy  Nephrolithiasis (743933735):  Resolved.   Family History:    No family history items have been selected or recorded.   Procedure history:    Esophagogastroduodenoscopy (308177024) on 2018 at 77 Years.  Comments:  2018 9:36 AM - Mary Peguero  Indication:  Abdominal pain (failed treatment).  History of ureteroscopy and laser lithotripsy ([id number]) on 1/15/2018 at 77 Years.  Comments:  2018 5:54 PM - Estee Lemos CMA  cysto, left retrograde pyelogram, fluoroscopy for left ureteral dilation, left ureteroscoy with holmium laser lithotripsy, left ureteral stent placement  Date of last mammogram ([id number]) on 2017 at 77 Years.  Comments:  2017 12:07 PM - Estee Lemos CMA  Unilateral Diag Mammo of the right breast   Recommend a 6-month f/u BL mammo for continuation of yrly screening.  Biopsy ([id number]) on 3/23/2015 at 74 Years.  Comments:  2017 1:15 PM - Reyna Vasquez  Of antrum.  Colonoscopy (857022356) in  at 71 Years.  Comments:  2017 12:07 PM - Estee Lemos CMA  Sigmoid polyp  Lumpectomy (6572500397).  Trigger thumb (339056951).  Comments:  2017 11:42 AM - Reyna Vasquez  Surgery - both.  Cataract surgery (621092950).  Nephrectomy (5591664880).  Comments:  2017 1:38 PM - Reyna Vasquez  Right  Total knee replacement (9124070799).  Comments:  2017 1:36 PM - Reyna Vasquez  bilateral  Tonsillectomy (195743298).  Tubal ligation (419549362).  Radiation (544800172).   section (24428196).  Comments:  2017 1:34 PM - Reyna Vasquez  X 1  Chemotherapy ([id number]).   Social History:        Tobacco Assessment: Denies Tobacco Use            Never  (less than 100 in lifetime)      Home and Environment Assessment            Marital status: .        Physical Examination   Vital Signs   10/11/2018 1:32 PM CDT Temperature Tympanic 98.9 DegF    Peripheral Pulse Rate 83 bpm    Systolic Blood Pressure 141 mmHg  HI    Diastolic Blood Pressure 89 mmHg  HI    Mean Arterial Pressure 106 mmHg    BP Site Right arm      Measurements from flowsheet : Measurements   10/11/2018 1:32 PM CDT Height Measured - Standard 62.5 in    Weight Measured - Standard 157 lb    BSA 1.77 m2    Body Mass Index 28.25 kg/m2  HI      General:  No acute distress.    Eye:  Normal conjunctiva.    HENT:  Normocephalic, Normal hearing, Oral mucosa is moist.    Neck:  Supple, Non-tender, No carotid bruit, No lymphadenopathy, No thyromegaly.    Respiratory:  Lungs are clear to auscultation, Respirations are non-labored.    Cardiovascular:  Normal rate, Regular rhythm, No murmur, No edema.    Gastrointestinal:  Soft, Non-tender.    Genitourinary:  No costovertebral angle tenderness.    Musculoskeletal:  Bruising and tenderness left second and third toes.    Integumentary:  No pallor.    Neurologic:  No focal deficits.    Cognition and Speech:  Speech clear and coherent, forgetful.    Psychiatric:  Cooperative, Appropriate mood & affect.       Review / Management   Results review:  Lab results   10/11/2018 2:34 PM CDT UA Epithelial Cells Many    UA Hyaline Cast 0-2    UA WBC 3-5    UA RBC 0-2    UA Bacteria Few   .    Radiology results   X-ray, Left foot , Reported at  10/11/2018 4:20:00 PM, Reveals no acute disease process      Impression and Plan   Diagnosis     Injury of foot, left (XCA94-PI S99.922A).     Incontinence (MQX99-YG R32).     Immunization due (NVE91-QZ Z23).     Alzheimer's dementia with behavioral disturbance (BXK70-PH G30.9).     Patient Instructions:       Counseled: Patient, Family, Spouse, Reviewed safety issues and appropriate measures.    Summary:  Possible UTI. .    Orders      Orders (Selected)   Outpatient Orders  Ordered (In Transit)  Culture, Urine, Routine* ([facility]): Specimen Type: Urine (Clean Catch), Collection Date: 10/11/18 13:53:00 CDT  Completed  40819 Influenza virus vaccine, quadrivalent, riv4 ([facility]): Quantity: 1, Immunization due.

## 2022-02-16 NOTE — NURSING NOTE
Comprehensive Intake Entered On:  2/7/2019 11:07 AM CST    Performed On:  2/7/2019 10:58 AM CST by Tatianna Holguin LPN   Chief Complaint :   possible uti, urgency,frequency, dysuria   Weight Measured :   159.08 lb(Converted to: 159 lb 1 oz, 72.16 kg)    Systolic Blood Pressure :   130 mmHg   Diastolic Blood Pressure :   90 mmHg (HI)    Mean Arterial Pressure :   103 mmHg   Peripheral Pulse Rate :   74 bpm   Temperature Tympanic :   98.7 DegF(Converted to: 37.1 DegC)    Oxygen Saturation :   96 %   Tatianna Holguin LPN - 2/7/2019 10:58 AM CST   Health Status   Allergies Verified? :   Yes   Medication History Verified? :   Yes   Medical History Verified? :   Yes   Pre-Visit Planning Status :   Completed   Tobacco Use? :   Never smoker   Tatianna Holguin LPN - 2/7/2019 10:58 AM CST   Consents   Consent for Immunization Exchange :   Consent Granted   Consent for Immunizations to Providers :   Consent Granted   Tatianna Holguin LPN - 2/7/2019 10:58 AM CST   Meds / Allergies   (As Of: 2/7/2019 11:07:01 AM CST)   Allergies (Active)   opium  Estimated Onset Date:   Unspecified ; Reactions:   passes out ; Created By:   Reyna Vasquez; Reaction Status:   Active ; Category:   Drug ; Substance:   opium ; Type:   Allergy ; Updated By:   Reyna Vasquez; Reviewed Date:   2/7/2019 11:02 AM CST      oxyCODONE  Estimated Onset Date:   Unspecified ; Created By:   Reyna Vasquez; Reaction Status:   Active ; Category:   Drug ; Substance:   oxyCODONE ; Type:   Allergy ; Updated By:   Reyna Vasquez; Reviewed Date:   2/7/2019 11:02 AM CST      propoxyphene  Estimated Onset Date:   Unspecified ; Reactions:   passes out ; Created By:   Reyna Vasquez; Reaction Status:   Active ; Category:   Drug ; Substance:   propoxyphene ; Type:   Allergy ; Severity:   Severe ; Updated By:   Reyna Vasquez; Reviewed Date:   2/7/2019 11:02 AM CST        Medication List   (As Of: 2/7/2019 11:07:01 AM CST)   Prescription/Discharge Order     lisinopril  :   lisinopril ; Status:   Prescribed ; Ordered As Mnemonic:   lisinopril 10 mg oral tablet ; Simple Display Line:   1 tab(s), Oral, daily, 30 tab(s), 0 Refill(s) ; Ordering Provider:   Garrison Lopez MD; Catalog Code:   lisinopril ; Order Dt/Tm:   1/21/2019 1:07:45 PM          memantine  :   memantine ; Status:   Prescribed ; Ordered As Mnemonic:   memantine 10 mg oral tablet ; Simple Display Line:   1 tab(s), Oral, bid, 60 tab(s), 0 Refill(s) ; Ordering Provider:   Garrison Lopez MD; Catalog Code:   memantine ; Order Dt/Tm:   1/25/2019 10:16:00 AM          omeprazole  :   omeprazole ; Status:   Prescribed ; Ordered As Mnemonic:   omeprazole 20 mg oral delayed release capsule ; Simple Display Line:   20 mg, 1 cap(s), PO, bid, Take 30 minutes before meal, 180 cap(s), 3 Refill(s) ; Ordering Provider:   Garrison Lopez MD; Catalog Code:   omeprazole ; Order Dt/Tm:   4/5/2018 2:08:05 PM            Home Meds    alginic acid/Al hydroxide/Mg trisilicate  :   alginic acid/Al hydroxide/Mg trisilicate ; Status:   Documented ; Ordered As Mnemonic:   Gaviscon ; Simple Display Line:   PRN: for indigestion, 0 Refill(s) ; Catalog Code:   alginic acid/Al hydroxide/Mg trisilicate ; Order Dt/Tm:   11/30/2017 1:30:28 PM          ALPRAZolam  :   ALPRAZolam ; Status:   Documented ; Ordered As Mnemonic:   ALPRAZolam 0.5 mg oral tablet ; Simple Display Line:   0.5 mg, 1 tab(s), PO, hs, PRN: for anxiety, 0 Refill(s) ; Catalog Code:   ALPRAZolam ; Order Dt/Tm:   2/7/2019 11:04:25 AM          aspirin/citric acid/sodium bicarbonate  :   aspirin/citric acid/sodium bicarbonate ; Status:   Documented ; Ordered As Mnemonic:   [place] ; Simple Display Line:   po, q4 hrs, PRN: as needed for indigestion, 0 Refill(s) ; Catalog Code:   aspirin/citric acid/sodium bicarbonate ; Order Dt/Tm:   11/30/2017 1:29:59 PM          calcium carbonate/famotidine/Mg hydroxide  :   calcium carbonate/famotidine/Mg hydroxide ; Status:   Documented ; Ordered  As Mnemonic:   Pepcid Complete ; Simple Display Line:   1 tab(s), po, q12 hrs, PRN: as needed for dyspepsia, 0 Refill(s) ; Catalog Code:   calcium carbonate/famotidine/Mg hydroxid ; Order Dt/Tm:   11/30/2017 1:30:15 PM          docusate-senna  :   docusate-senna ; Status:   Documented ; Ordered As Mnemonic:   docusate-senna 50 mg-8.6 mg oral tablet ; Simple Display Line:   1 tab(s), po, daily, PRN: for constipation, 0 Refill(s) ; Catalog Code:   docusate-senna ; Order Dt/Tm:   11/30/2017 11:18:33 AM          fexofenadine  :   fexofenadine ; Status:   Documented ; Ordered As Mnemonic:   fexofenadine 180 mg oral tablet ; Simple Display Line:   180 mg, 1 tab(s), po, daily, PRN: as needed for allergy symptoms, 0 Refill(s) ; Catalog Code:   fexofenadine ; Order Dt/Tm:   11/30/2017 11:14:41 AM          ondansetron  :   ondansetron ; Status:   Documented ; Ordered As Mnemonic:   ondansetron 4 mg oral tablet, disintegrating ; Simple Display Line:   4 mg, 1 tab(s), PO, qid, PRN: for nausea/vomiting, 0 Refill(s) ; Catalog Code:   ondansetron ; Order Dt/Tm:   11/30/2017 11:15:33 AM          polyethylene glycol 3350  :   polyethylene glycol 3350 ; Status:   Documented ; Ordered As Mnemonic:   polyethylene glycol 3350 ; Simple Display Line:   17 gm, po, daily, 0 Refill(s) ; Catalog Code:   polyethylene glycol 3350 ; Order Dt/Tm:   11/30/2017 11:16:18 AM          risperiDONE  :   risperiDONE ; Status:   Documented ; Ordered As Mnemonic:   RisperDAL 0.25 mg oral tablet ; Simple Display Line:   0.25 mg, 1 tab(s), Oral, bid, 0 Refill(s) ; Catalog Code:   risperiDONE ; Order Dt/Tm:   9/13/2018 8:00:11 PM

## 2022-02-16 NOTE — PROGRESS NOTES
Patient:   SURINDER VARGAS            MRN: [id number]            FIN: [id number]               Age:   77 years     Sex:  Female     :  1940   Associated Diagnoses:   Unspecified dementia without behavioral disturbance; History of breast cancer; Vasomotor rhinitis; Nocturia   Author:   Garrison Lopez MD      Visit Information      Date of Service: 2018 11:32 am  Performing Location: [facility]  Encounter#: [id number]      Primary Care Provider (PCP):  JOHANN -UNKNOWN,    NPI# [id number]      Referring Provider:  Garrison Lopez MD    NPI# [id number]      Chief Complaint            Additional Information:No additional information recorded during visit.   Chief complaint and symptoms as noted above and confirmed with patient.  Recent lab and diagnostic studies reviewed with patient      History of Present Illness   2017: Rosalie presents to clinic to establish care.  She is the mother of Mary Hargrove.  She and her , Charlie, recently relocated from [place] to [place].  Still trying to get acquainted to the area.  They are living in a town home.  No troubles with living independently.  Rosalie has dealt with dementia over the past several years.  Currently maintained on memantine.  It sounds like she was previously trialed on acetylcholinesterase inhibitor and generally did not tolerate well.  She is previous neurology assessment ruling out alternative forms of dementia.  She no longer drives.  No issues with behavioral disturbances or hallucinations.  Sounds like she and Charlie have fared well as a team.  No decisional incapacity or activated power of  previously.  She has a previous history of renal cell cancer status post right nephrectomy.  Does have history of urinary calculi in her remaining kidney.  She was being followed by urology back in [place].  She had been offered previous stone extraction consents at that time were for general surveillance with regular KUBs.  Also remote  history of breast cancer treated with lumpectomy and radiation.  She has continued with annual mammography.  Not currently on any anti-hormonal therapy.    2/15/2018: Pat returns to clinic at the instruction Dr. Roland for blood pressure follow-up.  In clinic blood pressures consistently in the 150s.  Currently not maintain any antihypertensive medication.  Brings up concerns related to persistent morning nausea; generally does improve after eating.  Maintained on omeprazole daily.  Not currently taking any evening Pepcid.  Bring up concerns related to persistent memory decline    6/22/2018: Presents with 3 day history of urinary frequency urgency and burning.  Symptom onset correlated with HIDA scan performed that same day.  No fever chills.  Some noted confusion this morning.  No recent antibiotic exposures.  Does have remote history of kidney stone and stone extraction this past winter.  No recent bowel changes.  Has been going undergoing workup related to chronic epigastric discomfort and nausea.         Review of Systems   Constitutional:  No fever, No chills.    Eye:  Negative except as documented in history of present illness.    Ear/Nose/Mouth/Throat:  Negative except as documented in history of present illness.    Respiratory:  No shortness of breath.    Cardiovascular:  No chest pain, No palpitations, No peripheral edema, No syncope.    Gastrointestinal:  Nausea, Heartburn, No vomiting, No abdominal pain.    Genitourinary:  Dysuria, Change in urine stream, No hematuria.    Hematology/Lymphatics:  Negative except as documented in history of present illness.    Endocrine:  nocturia, No excessive thirst, No polyuria.    Immunologic:  No recurrent fevers.    Musculoskeletal:  No joint pain, No muscle pain.    Neurologic:  Alert and oriented X4, Confusion, No numbness, No tingling, No headache.       Health Status   Allergies:    Allergic Reactions (Selected)  Severe  Propoxyphene (Passes out)  Severity Not  Documented  Opium (Passes out)  OxyCODONE (No reactions were documented)   Medications:  (Selected)   Prescriptions  Prescribed  Keflex 500 mg oral capsule: 1 cap(s) ( 500 mg ), PO, tid, # 15 cap(s), 0 Refill(s), Type: Maintenance, Pharmacy: [facility] [id number], 1 cap(s) po tid,x5 day(s)  lisinopril 10 mg oral tablet: 1 tab(s) ( 10 mg ), PO, Daily, # 90 tab(s), 3 Refill(s), Type: Maintenance, Pharmacy: [facility] [id number], 1 tab(s) po daily  memantine 10 mg oral tablet: See Instructions, Instructions: TAKE 1 TABLET BY MOUTH TWICE DAILY, # 180 tab(s), 1 Refill(s), Type: Soft Stop, Pharmacy: [facility] [id number]  omeprazole 20 mg oral delayed release capsule: 1 cap(s) ( 20 mg ), PO, bid, Instructions: Take 30 minutes before meal, # 180 cap(s), 3 Refill(s), Type: Maintenance, Pharmacy: [facility] [id number], 1 cap(s) po bid,Instr:Take 30 minutes before meal  Documented Medications  Documented  Cristy-Southgate: po, q4 hrs, PRN: as needed for indigestion, 0 Refill(s), Type: Maintenance  Gaviscon: PRN: for indigestion, 0 Refill(s), Type: Maintenance  Pepcid Complete: 1 tab(s), po, q12 hrs, PRN: as needed for dyspepsia, 0 Refill(s), Type: Maintenance  docusate-senna 50 mg-8.6 mg oral tablet: 1 tab(s), po, daily, PRN: for constipation, 0 Refill(s), Type: Maintenance  fexofenadine 180 mg oral tablet: 1 tab(s) ( 180 mg ), po, daily, PRN: as needed for allergy symptoms, 0 Refill(s), Type: Maintenance  ondansetron 4 mg oral tablet, disintegratin tab(s) ( 4 mg ), PO, qid, PRN: for nausea/vomiting, 0 Refill(s), Type: Maintenance  polyethylene glycol 3350: ( 17 gm ), po, daily, 0 Refill(s), Type: Maintenance   Problem list:    All Problems  Arthritis / SNOMED CT 1151020 / Confirmed  Unspecified dementia without behavioral disturbance / SNOMED CT 92861741 / Confirmed  History of breast cancer / SNOMED CT 0827914117 / Confirmed  History of nephrectomy, unilateral / SNOMED CT 7973855071 /  Confirmed  Hypertension / SNOMED CT 1971310506 / Confirmed  Neuropathy / SNOMED CT 2670905211 / Confirmed  Vasomotor rhinitis / SNOMED CT 61450918 / Confirmed  Resolved: Breast cancer / SNOMED CT 107938095  Resolved: Nephrolithiasis / SNOMED CT 444097169  Resolved: Cancer of kidney / SNOMED CT 632462957  Canceled: History of kidney cancer / SNOMED CT 531515235      Histories   Past Medical History:    Active  Arthritis (7095133)  Unspecified dementia without behavioral disturbance (51035895)  Vasomotor rhinitis (88711599)  Neuropathy (9660121114)  History of breast cancer (5125098849)  Resolved  Breast cancer (540573582):  Resolved.  Cancer of kidney (130940106):  Resolved.  Comments:  11/30/2017 CST 11:24 AM CST - Estee Lemos CMA  right renal cell carcinoma s/p right nephrectomy  Nephrolithiasis (061975704):  Resolved.   Family History:    No family history items have been selected or recorded.   Procedure history:    Esophagogastroduodenoscopy (750339327) on 4/17/2018 at 77 Years.  Comments:  4/26/2018 9:36 AM - Mary Peguero  Indication:  Abdominal pain (failed treatment).  History of ureteroscopy and laser lithotripsy (5694539111) on 1/15/2018 at 77 Years.  Comments:  1/18/2018 5:54 PM - Estee Lemos CMA  cysto, left retrograde pyelogram, fluoroscopy for left ureteral dilation, left ureteroscoy with holmium laser lithotripsy, left ureteral stent placement  Date of last mammogram (3608892184) on 9/8/2017 at 77 Years.  Comments:  11/30/2017 12:07 PM - Estee Lemos CMA  Unilateral Diag Mammo of the right breast   Recommend a 6-month f/u BL mammo for continuation of yrly screening.  Biopsy ([id number]) on 3/23/2015 at 74 Years.  Comments:  11/29/2017 1:15 PM - Reyna Vasquez  Of antrum.  Colonoscopy (105295274) in 2011 at 71 Years.  Comments:  11/30/2017 12:07 PM - Estee Lemos CMA  Sigmoid polyp  Lumpectomy (2706146199).  Trigger thumb (763024119).  Comments:  11/29/2017 11:42 MARCEL - Reyna Vasquez  Surgery -  both.  Cataract surgery (455451642).  Nephrectomy (4561856392).  Comments:  2017 1:38 PM - Reyna Vasquez  Right  Total knee replacement (0296829493).  Comments:  2017 1:36 PM - Reyna Vasquez  bilateral  Tonsillectomy (406236125).  Tubal ligation (228453197).  Radiation (240284797).   section (17596390).  Comments:  2017 1:34 PM - Reyna Vasquez  X 1  Chemotherapy (153976961).   Social History:        Tobacco Assessment: Denies Tobacco Use            Never (less than 100 in lifetime)      Home and Environment Assessment            Marital status: .        Physical Examination   vital signs stable, as noted above   VS/Measurements   General:  Alert and oriented, No acute distress.    Eye:  Extraocular movements are intact.    HENT:  Normocephalic, Tympanic membranes are clear.    Neck:  Supple.    Respiratory:  Lungs are clear to auscultation, Respirations are non-labored.    Cardiovascular:  Normal rate, Regular rhythm, No murmur, No edema.    Gastrointestinal:  Soft, Non-tender, Non-distended, focal epigastric tenderness on palpation.    Genitourinary:  No costovertebral angle tenderness.    Musculoskeletal:  Normal range of motion, Normal strength, No tenderness.    Neurologic:  Alert, Oriented, Normal motor function, No focal deficits.    Cognition and Speech:  Oriented, Speech clear and coherent.    Psychiatric:  Appropriate mood & affect.       Review / Management   Results review:  Lab results   2018 11:34 AM CDT UA Epithelial Cells Moderate    UA WBC >100    UA RBC 26-50    UA Bacteria Moderate   2018 10:59 AM CDT RPR Ql NON-REACTIVE   2018 2:48 PM CDT Sodium Level 141 mmol/L    Potassium Level 4.2 mmol/L    Chloride Level 104 mmol/L    CO2 Level 27 mmol/L    Glucose Level 96 mg/dL    BUN 19 mg/dL    Creatinine 1.05 mg/dL  HI    BUN/Creat Ratio 18    eGFR 50 mL/min/1.73m2  LOW    eGFR African American 58 mL/min/1.73m2  LOW    Calcium Level 9.5 mg/dL    Bili Total  0.5 mg/dL    Bili Direct 0.1 mg/dL    Bili Indirect 0.4    Alk Phos 97 unit/L    AST/SGOT 16 unit/L    ALT/SGPT 10 unit/L    Protein Total 7.0 gm/dL    Albumin Level 4.1 gm/dL    Globulin 2.9    A/G Ratio 1.4    Iron Level 100 mcg/dL    TIBC 314    Iron Saturation 32    Ferritin 92 ng/mL    WBC 5.8    RBC 4.13    Hgb 13.2 gm/dL    Hct 37.6 %    MCV 91.0 fL    MCH 32.0 pg    MCHC 35.1 gm/dL    RDW 12.9 %    Platelet 217    MPV 10.0 fL   .       Impression and Plan   Diagnosis     Unspecified dementia without behavioral disturbance (GAO92-ST F03.90).     History of breast cancer (MRM52-OH Z85.3).     Vasomotor rhinitis (EEO97-KD J30.0).     Nocturia (XJO27-GG R35.1).         .) morning nausea; persistent - following with [facility]   - omeprazole to 20mg BID (take prior to meals)   - ECG (4/2018): unremarkable   - recent HIDA - reportedly normal    .) cystitis; no complicating features  UA with significant pyuria with associated LUTs  Urine Cx processed  - empirically started on keflex 500mg TID for  5 days    plan as previously outlined:     .) hypertension, controlled  current antihypertensive regimen: lisinopril 10mg daily   regimen changes: none  intolerance:  future titration/work-up plan:     .) dementia, - likely Alzheimer's; overall declining function   - previous assessment by neurology   - poor tolerance of acetylcholinesterase inhibitors   - currently on memantine 10mg BID   - no longer driving   - no issues with ADLs or living independently    .) h/o nephrectomy and nephrolithiasis   - following with urology    .) h/o breast cancer   - prefers to continue with annual mammography    .) health maintenance   - no further CRC or cervical cancer screening   - info on advance direct provided    RTC as previously scheduled

## 2022-02-16 NOTE — CARE COORDINATION
Pt appears on [facility] chronic disease panel as out of parameters for HTN.  Placed RTC CSS BP 3 wks, pt has RTC for 10/2018 follow up with labs

## 2022-02-16 NOTE — NURSING NOTE
Comprehensive Intake Entered On:  4/4/2019 2:46 PM CDT    Performed On:  4/4/2019 2:38 PM CDT by Estee Lemos CMA               Summary   Chief Complaint :   Nausea x 1wk  - noticed after starting Quetiapine in place of risperdal.  Still using lorazepam prn    Weight Measured :   151.8 lb(Converted to: 151 lb 13 oz, 68.86 kg)    Systolic Blood Pressure :   136 mmHg (HI)    Diastolic Blood Pressure :   90 mmHg (HI)    Mean Arterial Pressure :   105 mmHg   Peripheral Pulse Rate :   64 bpm   Temperature Tympanic :   98.9 DegF(Converted to: 37.2 DegC)    Estee Lemos CMA - 4/4/2019 2:38 PM CDT   Health Status   Allergies Verified? :   Yes   Medication History Verified? :   Yes   Medical History Verified? :   Yes   Pre-Visit Planning Status :   Completed   Tobacco Use? :   Never smoker   Estee Lemos CMA - 4/4/2019 2:38 PM CDT   Social History   Social History   (As Of: 4/4/2019 2:46:12 PM CDT)   Tobacco:  Denies Tobacco Use      Never (less than 100 in lifetime)   (Last Updated: 11/30/2017 11:22:54 AM CST by Estee Lemos CMA)          Home and Environment:        Marital status: .   (Last Updated: 11/29/2017 11:42:55 AM CST by Reyna Vasquez)            More Vitals   Systolic Blood Pressure Standing :   132 mmHg   Diastolic Blood Pressure Standing :   91 mmHg   Pulse Rate Standing :   92 bpm   Estee Lemos CMA - 4/4/2019 2:38 PM CDT

## 2022-02-16 NOTE — TELEPHONE ENCOUNTER
---------------------  From: Kayla Guido LPN (Phone Messages Pool ([id number]))   To: Garrison Lopez MD;     Sent: 4/26/2019 10:51:59 AM CDT  Subject: sleep trouble     Phone Message    PCP:   asked for BR[facility]      Time of Call:  10:13am       Person Calling:  pt's  Zaid  Phone number:  [phone]    Note:   Zaid LM stating he is having trouble getting pt to bed at night. He is wondering if there is any medication that BR[facility] could give her.    Last office visit and reason:  4/4/19 dementia, anorexiaDaughter Mary Hargrove called and stated that her dad tried calling earlier and his message cut off, she stated that her mom is suppose   to be going into a memory care place in the next 10 days but they are requesting something that will calm her down and help her sleep at night as she has become violent and throws things. Please advise and Mary can be contacted at [phone]        KVNG Yates CMA---------------------  From: Garrison Lopez MD   To: Phone Messages Pool ([id number] - [place]);     Sent: 4/26/2019 1:04:53 PM CDT  Subject: RE: sleep trouble     I would advise giving additional dose risperdal 0.5mg qhs to see if this helps.  I would recommend trialing trazodone 50mg qhs  Can try given lorazepam 0.5mg PO qhs - though cautioned that this could backfire and back confusion worseJesonu and Mary both informed of below. Rx for trazodone sent to [facility] in [place].

## 2022-02-16 NOTE — PROGRESS NOTES
Patient:   SURINDER VARGAS            MRN: [id number]            FIN: [id number]               Age:   77 years     Sex:  Female     :  1940   Associated Diagnoses:   Unspecified dementia without behavioral disturbance; History of breast cancer; Vasomotor rhinitis; Nocturia   Author:   Garrison Lopez MD      Visit Information      Date of Service: 2018 01:26 pm  Performing Location: [facility]  Encounter#: [id number]      Primary Care Provider (PCP):  JOHANN -UNKNOWN,    NPI# [id number]      Referring Provider:  Garrison Lopez MD    NPI# [id number]      Chief Complaint   2018 1:45 PM CDT     ongoing issues with nausea/stomach pains in ams              Additional Information:No additional information recorded during visit.   Chief complaint and symptoms as noted above and confirmed with patient.  Recent lab and diagnostic studies reviewed with patient      History of Present Illness   2017: Rosalie presents to clinic to establish care.  She is the mother of Mary Hargrove.  She and her , Charlie, recently relocated from [place] to [place].  Still trying to get acquainted to the area.  They are living in a town home.  No troubles with living independently.  Rosalie has dealt with dementia over the past several years.  Currently maintained on memantine.  It sounds like she was previously trialed on acetylcholinesterase inhibitor and generally did not tolerate well.  She is previous neurology assessment ruling out alternative forms of dementia.  She no longer drives.  No issues with behavioral disturbances or hallucinations.  Sounds like she and Charlie have fared well as a team.  No decisional incapacity or activated power of  previously.  She has a previous history of renal cell cancer status post right nephrectomy.  Does have history of urinary calculi in her remaining kidney.  She was being followed by urology back in [place].  She had been offered previous stone extraction consents  at that time were for general surveillance with regular KUBs.  Also remote history of breast cancer treated with lumpectomy and radiation.  She has continued with annual mammography.  Not currently on any anti-hormonal therapy.    2/15/2018: Pat returns to clinic at the instruction Dr. Roland for blood pressure follow-up.  In clinic blood pressures consistently in the 150s.  Currently not maintain any antihypertensive medication.  Brings up concerns related to persistent morning nausea; generally does improve after eating.  Maintained on omeprazole daily.  Not currently taking any evening Pepcid.  Bring up concerns related to persistent memory decline    4/5/2018:  Presents with peristent morning symptoms of nausea, abdominal discomfort; typically improves throughout the day.  Take omeprazole 20mg daily at night; had taken BID dosing years ago.  Remote history of EGD many years ago - did show local gastritis per .  Has been taking Gaviscon - minimal relief.  No emesis; no RUQ pains; no previous gallbladder troubles or surgery.  No bowel changes.  No fever/chills.  No NSAIDs use.   noticing that memory decline worsening with time.           Review of Systems   Constitutional:  No fever, No chills.    Eye:  Negative except as documented in history of present illness.    Ear/Nose/Mouth/Throat:  Negative except as documented in history of present illness.    Respiratory:  No shortness of breath.    Cardiovascular:  No chest pain, No palpitations, No peripheral edema, No syncope.    Gastrointestinal:  Nausea, Heartburn, No vomiting.         Abdominal pain: Bilateral, Lower quadrant, Epigastric area.    Genitourinary:  No dysuria, No hematuria.    Hematology/Lymphatics:  Negative except as documented in history of present illness.    Endocrine:  nocturia, No excessive thirst, No polyuria.    Immunologic:  No recurrent fevers.    Musculoskeletal:  No joint pain, No muscle pain.    Neurologic:  Alert and  oriented X4, No numbness, No tingling, No headache.       Health Status   Allergies:    Allergic Reactions (Selected)  Severe  Propoxyphene (Passes out)  Severity Not Documented  Opium (Passes out)  OxyCODONE (No reactions were documented)   Medications:  (Selected)   Prescriptions  Prescribed  lisinopril 10 mg oral tablet: 1 tab(s) ( 10 mg ), PO, Daily, # 90 tab(s), 3 Refill(s), Type: Maintenance, Pharmacy: [id number], 1 tab(s) po daily  memantine 10 mg oral tablet: See Instructions, Instructions: TAKE 1 TABLET BY MOUTH TWICE DAILY, # 180 tab(s), Type: Soft Stop, Pharmacy: [facility] [id number], TAKE 1 TABLET BY MOUTH TWICE DAILY  omeprazole 20 mg oral delayed release capsule: 1 cap(s) ( 20 mg ), PO, bid, Instructions: Take 30 minutes before meal, # 180 cap(s), 3 Refill(s), Type: Maintenance, Pharmacy: [facility], 1 cap(s) po bid,Instr:Take 30 minutes before meal  Documented Medications  Documented  [place]: po, q4 hrs, PRN: as needed for indigestion, 0 Refill(s), Type: Maintenance  Gaviscon: PRN: for indigestion, 0 Refill(s), Type: Maintenance  Pepcid Complete: 1 tab(s), po, q12 hrs, PRN: as needed for dyspepsia, 0 Refill(s), Type: Maintenance  docusate-senna 50 mg-8.6 mg oral tablet: 1 tab(s), po, daily, PRN: for constipation, 0 Refill(s), Type: Maintenance  fexofenadine 180 mg oral tablet: 1 tab(s) ( 180 mg ), po, daily, PRN: as needed for allergy symptoms, 0 Refill(s), Type: Maintenance  ondansetron 4 mg oral tablet, disintegratin tab(s) ( 4 mg ), PO, qid, PRN: for nausea/vomiting, 0 Refill(s), Type: Maintenance  polyethylene glycol 3350: ( 17 gm ), po, daily, 0 Refill(s), Type: Maintenance   Problem list:    All Problems  Arthritis / SNOMED CT 4826433 / Confirmed  Unspecified dementia without behavioral disturbance / SNOMED CT 61377555 / Confirmed  History of breast cancer / SNOMED CT 5039327951 / Confirmed  History of nephrectomy, unilateral / SNOMED CT 1295897501  / Confirmed  Hypertension / SNOMED CT 2577559314 / Confirmed  Nephrolithiasis / SNOMED CT 889870985 / Confirmed  Neuropathy / SNOMED CT 0145894073 / Confirmed  Vasomotor rhinitis / SNOMED CT 55681111 / Confirmed  Resolved: Breast cancer / SNOMED CT 105002786  Resolved: Cancer of kidney / SNOMED CT 453297432  Canceled: History of kidney cancer / SNOMED CT 678531351      Histories   Past Medical History:    Active  Arthritis (7275668)  Unspecified dementia without behavioral disturbance (49535638)  Vasomotor rhinitis (44857607)  Neuropathy (1785861667)  History of breast cancer (8290026376)  Resolved  Breast cancer (170473402):  Resolved.  Cancer of kidney (210055504):  Resolved.  Comments:  11/30/2017 CST 11:24 AM CST - Estee Lemos CMA  right renal cell carcinoma s/p right nephrectomy   Family History:    No family history items have been selected or recorded.   Procedure history:    History of ureteroscopy and laser lithotripsy ([id number]) on 1/15/2018 at 77 Years.  Comments:  1/18/2018 5:54 PM - Estee Lemos CMA  cysto, left retrograde pyelogram, fluoroscopy for left ureteral dilation, left ureteroscoy with holmium laser lithotripsy, left ureteral stent placement  Date of last mammogram ([id number]) on 9/8/2017 at 77 Years.  Comments:  11/30/2017 12:07 PM - Estee Lemos CMA  Unilateral Diag Mammo of the right breast   Recommend a 6-month f/u BL mammo for continuation of yrly screening.  Biopsy ([id number]) on 3/23/2015 at 74 Years.  Comments:  11/29/2017 1:15 PM - Reyna Vasquez  Of antrum.  Colonoscopy (526156671) in 2011 at 71 Years.  Comments:  11/30/2017 12:07 PM - Estee Lemos CMA  Sigmoid polyp  Lumpectomy (1667435403).  Trigger thumb (228746871).  Comments:  11/29/2017 11:42 AM - Reyna Vasquez  Surgery - both.  Cataract surgery ([id number]).  Nephrectomy (1147168472).  Comments:  11/29/2017 1:38 Reyna Nolasco  Right  Total knee replacement (6398035158).  Comments:  11/29/2017 1:36 RAMAN Vasquez  Reyna  bilateral  Tonsillectomy (197365312).  Tubal ligation (397196137).  Radiation (626654995).   section (67479907).  Comments:  2017 1:34 PM - Reyna Vasquez  X 1  Chemotherapy ([id number]).   Social History:        Tobacco Assessment: Denies Tobacco Use            Never (less than 100 in lifetime)      Home and Environment Assessment            Marital status: .        Physical Examination   vital signs stable, as noted above   Vital Signs   2018 1:45 PM CDT Temperature Tympanic 98.1 DegF    Peripheral Pulse Rate 72 bpm    Systolic Blood Pressure 136 mmHg  HI    Diastolic Blood Pressure 88 mmHg  HI    Mean Arterial Pressure 104 mmHg      Measurements from flowsheet : Measurements   2018 1:45 PM CDT     Weight Measured - Standard                156.4 lb     General:  Alert and oriented, No acute distress.    Eye:  Extraocular movements are intact.    HENT:  Normocephalic, Tympanic membranes are clear, Oral mucosa is moist.    Neck:  Supple, No lymphadenopathy.    Respiratory:  Lungs are clear to auscultation, Respirations are non-labored.    Cardiovascular:  Normal rate, Regular rhythm, No murmur, No edema.    Gastrointestinal:  Soft, focal epigastric tenderness on palpation.    Musculoskeletal:  Normal range of motion, Normal strength, No tenderness.    Neurologic:  Alert, Oriented, Normal motor function, No focal deficits.    Cognition and Speech:  Oriented, Speech clear and coherent.    Psychiatric:  Appropriate mood & affect.       Review / Management   Results review:  Lab results   2018 11:24 AM CST Sodium Level 139 mmol/L    Potassium Level 4.3 mmol/L    Chloride Level 104 mmol/L    CO2 Level 27 mmol/L    Glucose Level 94 mg/dL    BUN 18 mg/dL    Creatinine 1.16 mg/dL  HI    BUN/Creat Ratio 16    eGFR 45 mL/min/1.73m2  LOW    eGFR African American 52 mL/min/1.73m2  LOW    Calcium Level 9.5 mg/dL    WBC 6.0    RBC 4.35    Hgb 13.6 gm/dL    Hct 39.5 %    MCV 90.8 fL    MCH  31.3 pg    MCHC 34.4 gm/dL    RDW 12.2 %    Platelet 228    MPV 9.8 fL   .       Impression and Plan   Diagnosis     Unspecified dementia without behavioral disturbance (VQY86-RX F03.90).     History of breast cancer (ZMN92-UY Z85.3).     Vasomotor rhinitis (OPR00-PJ J30.0).     Nocturia (TKS43-ZY R35.1).         .) morning nausea; persistent - suspected gastritis/GERD   - advised to increase omeprazole to 20mg BID (take prior to meals)   - check CBC, BMP, LFTs, iron studies   - making arrangements for barium swallow   - will refer to GI given persistence of symptoms despite daily PPI use    .) hypertension, controlled  current antihypertensive regimen: lisinopril 10mg daily   regimen changes: none  intolerance:  future titration/work-up plan:     .) dementia, - likely Alzheimer's; overall declining function   - previous assessment by neurology   - poor tolerance of acetylcholinesterase inhibitors   - currently on memantine 10mg BID   - no longer driving   - no issues with ADLs or living independently    .) h/o nephrectomy and nephrolithiasis   - following with urology    .) h/o breast cancer   - prefers to continue with annual mammography    .) health maintenance   - no further CRC or cervical cancer screening   - info on advance direct provided    RTC as previously scheduled

## 2022-02-16 NOTE — PROGRESS NOTES
Patient:   SURINDER VARGAS            MRN: [id number]            FIN: [id number]               Age:   78 years     Sex:  Female     :  1940   Associated Diagnoses:   Unspecified dementia without behavioral disturbance; History of breast cancer; Vasomotor rhinitis; Nocturia   Author:   Garrison Lopez MD      Visit Information      Date of Service: 2019 02:26 pm  Performing Location: [facility]  Encounter#: [id number]      Primary Care Provider (PCP):  [id number] -UNKNOWN,    NPI# [id number]      Referring Provider:  Garrison Lopez MD    NPI# [id number]      Chief Complaint   2019 2:38 PM CDT     Nausea x 1wk  - noticed after starting Quetiapine in place of risperdal.  Still using lorazepam prn              Additional Information:No additional information recorded during visit.   Chief complaint and symptoms as noted above and confirmed with patient.  Recent lab and diagnostic studies reviewed with patient      History of Present Illness   2017: Rosalie presents to clinic to establish care.  She is the mother of Mary Hargrove.  She and her , Charlie, recently relocated from [place] to [place].  Still trying to get acquainted to the area.  They are living in a town home.  No troubles with living independently.  Rosalie has dealt with dementia over the past several years.  Currently maintained on memantine.  It sounds like she was previously trialed on acetylcholinesterase inhibitor and generally did not tolerate well.  She is previous neurology assessment ruling out alternative forms of dementia.  She no longer drives.  No issues with behavioral disturbances or hallucinations.  Sounds like she and Charlie have fared well as a team.  No decisional incapacity or activated power of  previously.  She has a previous history of renal cell cancer status post right nephrectomy.  Does have history of urinary calculi in her remaining kidney.  She was being followed by urology back in [place].  She  had been offered previous stone extraction consents at that time were for general surveillance with regular KUBs.  Also remote history of breast cancer treated with lumpectomy and radiation.  She has continued with annual mammography.  Not currently on any anti-hormonal therapy.    6/22/2018: Presents with 3 day history of urinary frequency urgency and burning.  Symptom onset correlated with HIDA scan performed that same day.  No fever chills.  Some noted confusion this morning.  No recent antibiotic exposures.  Does have remote history of kidney stone and stone extraction this past winter.  No recent bowel changes.  Has been going undergoing workup related to chronic epigastric discomfort and nausea.    3/22/2019: Rosalie presents to clinic for ER follow-up from visit earlier this week related neck pain.  She is prescribed diazepam which helped considerably with her rest.  Her  describes that her dementia especially her sundowning has substantially worsened over months.  Now is taking Risperdal twice daily.  Also takes scheduled Lorazepam 0.5 mg in the afternoon at the direction of her neurologist.  She has had wandering behavior.  Her locks have been changed which she cannot manipulate.  Her  states that rest can be quite difficult at night with her frequently being up    4/4/2019: Debbie returns for follow-up.  At the direction of her neurologist her Risperdal was changed over to Seroquel 25 mg twice daily.  Since this medication change she is described persistent nausea and has had an 8 pound weight loss since her last visit.  Her  states that she is hardly eating anything.  Her moods and sundowning especially seemed improved since these medication changes.  They are using Zofran on an as-needed basis.         Review of Systems   Constitutional:  No fever, No chills.    Eye:  Negative except as documented in history of present illness.    Ear/Nose/Mouth/Throat:  Negative except as documented  in history of present illness.    Respiratory:  No shortness of breath.    Cardiovascular:  No chest pain, No palpitations, No peripheral edema, No syncope.    Gastrointestinal:  Nausea, No vomiting, No heartburn, No abdominal pain.    Genitourinary:  No dysuria, No hematuria, No change in urine stream.    Hematology/Lymphatics:  Negative except as documented in history of present illness.    Endocrine:  nocturia, No excessive thirst, No polyuria.    Immunologic:  No recurrent fevers.    Musculoskeletal:  Neck pain, No joint pain, No muscle pain.    Neurologic:  Alert and oriented X4, Confusion, No numbness, No tingling, No headache.       Health Status   Allergies:    Allergic Reactions (Selected)  Severe  Propoxyphene (Passes out)  Severity Not Documented  Opium (Passes out)  OxyCODONE (No reactions were documented)   Medications:  (Selected)   Prescriptions  Prescribed  memantine 10 mg oral tablet: = 1 tab(s), Oral, bid, # 180 tab(s), 1 Refill(s), Type: Maintenance, Pharmacy: [facility] [id number], 1 tab(s) Oral bid  omeprazole 20 mg oral delayed release capsule: = 1 cap(s) ( 20 mg ), PO, bid, Instructions: Take 30 minutes before meal, # 180 cap(s), 3 Refill(s), Type: Maintenance, Pharmacy: [facility] [id number], 1 cap(s) Oral bid,Instr:Take 30 minutes before meal  prochlorperazine 5 mg oral tablet: = 1 tab(s) ( 5 mg ), PO, TID, PRN: for nausea/vomiting, # 30 tab(s), 1 Refill(s), Type: Maintenance, Pharmacy: [facility] [id number], 1 tab(s) Oral tid,PRN:for nausea/vomiting  Documented Medications  Documented  [place]: po, q4 hrs, PRN: as needed for indigestion, 0 Refill(s), Type: Maintenance  Gaviscon: PRN: for indigestion, 0 Refill(s), Type: Maintenance  LORazepam 0.5 mg oral tablet: = 1 tab(s) ( 0.5 mg ), Oral, daily, PRN: as needed for anxiety, 0 Refill(s), Type: Maintenance  Pepcid Complete: 1 tab(s), po, q12 hrs, PRN: as needed for dyspepsia, 0 Refill(s), Type: Maintenance  PreserVision  AREDS 2: Oral, daily, 0 Refill(s), Type: Maintenance  QUEtiapine 25 mg oral tablet: = 1 tab(s) ( 25 mg ), PO, bid, # 30 tab(s), 0 Refill(s), Type: Maintenance  RisperDAL 0.25 mg oral tablet: See Instructions, Instructions: 1 tab(s) Oral .25 qam and .5mg qhs, 0 Refill(s), Type: Maintenance  acetaminophen 325 mg oral tablet: = 2 tab(s) ( 650 mg ), Oral, q4 hrs, 0 Refill(s), Type: Maintenance  docusate-senna 50 mg-8.6 mg oral tablet: 1 tab(s), po, daily, PRN: for constipation, 0 Refill(s), Type: Maintenance  fexofenadine 180 mg oral tablet: 1 tab(s) ( 180 mg ), po, daily, PRN: as needed for allergy symptoms, 0 Refill(s), Type: Maintenance  lysine 500 mg oral tablet: = 1 tab(s) ( 500 mg ), Oral, daily, PRN: cold sores, 0 Refill(s), Type: Maintenance  ondansetron 4 mg oral tablet, disintegratin tab(s) ( 4 mg ), PO, qid, PRN: for nausea/vomiting, 0 Refill(s), Type: Maintenance  polyethylene glycol 3350: ( 17 gm ), po, daily, 0 Refill(s), Type: Maintenance,    Medications          *denotes recorded medication          *LORazepam 0.5 mg oral tablet: 0.5 mg, 1 tab(s), Oral, daily, PRN: as needed for anxiety, 0 Refill(s).          *QUEtiapine 25 mg oral tablet: 25 mg, 1 tab(s), PO, bid, 30 tab(s), 0 Refill(s).          *acetaminophen 325 mg oral tablet: 650 mg, 2 tab(s), Oral, q4 hrs, 0 Refill(s).          *Gaviscon: PRN: for indigestion, 0 Refill(s).          *[place]: po, q4 hrs, PRN: as needed for indigestion, 0 Refill(s).          *Pepcid Complete: 1 tab(s), po, q12 hrs, PRN: as needed for dyspepsia, 0 Refill(s).          *docusate-senna 50 mg-8.6 mg oral tablet: 1 tab(s), po, daily, PRN: for constipation, 0 Refill(s).          *fexofenadine 180 mg oral tablet: 180 mg, 1 tab(s), po, daily, PRN: as needed for allergy symptoms, 0 Refill(s).          *lysine 500 mg oral tablet: 500 mg, 1 tab(s), Oral, daily, PRN: cold sores, 0 Refill(s).          memantine 10 mg oral tablet: 1 tab(s), Oral, bid, 180 tab(s), 1  Refill(s).          *PreserVision AREDS 2: Oral, daily, 0 Refill(s).          omeprazole 20 mg oral delayed release capsule: 20 mg, 1 cap(s), PO, bid, Take 30 minutes before meal, 180 cap(s), 3 Refill(s).          *ondansetron 4 mg oral tablet, disintegratin mg, 1 tab(s), PO, qid, PRN: for nausea/vomiting, 0 Refill(s).          *polyethylene glycol 3350: 17 gm, po, daily, 0 Refill(s).          prochlorperazine 5 mg oral tablet: 5 mg, 1 tab(s), PO, TID, PRN: for nausea/vomiting, 30 tab(s), 1 Refill(s).          *RisperDAL 0.25 mg oral tablet: See Instructions, 1 tab(s) Oral .25 qam and .5mg qhs, 0 Refill(s).   Problem list:    All Problems  Arthritis / SNOMED CT 7532197 / Confirmed  Alzheimer's dementia with behavioral disturbance / SNOMED CT 2110891072 / Confirmed  History of breast cancer / SNOMED CT 6277354733 / Confirmed  History of nephrectomy, unilateral / SNOMED CT 7174240806 / Confirmed  Hypertension / SNOMED CT 2306032171 / Confirmed  Incontinence / SNOMED CT 61780865 / Confirmed  Neuropathy / SNOMED CT 8002512498 / Confirmed  Vasomotor rhinitis / SNOMED CT 79831085 / Confirmed  Resolved: Breast cancer / SNOMED CT 502717506  Resolved: Nephrolithiasis / SNOMED CT 344101313  Resolved: Cancer of kidney / SNOMED CT 318722208  Canceled: History of kidney cancer / SNOMED CT 669400894      Histories   Past Medical History:    Active  Arthritis (5762517)  Alzheimer's dementia with behavioral disturbance (6324268455)  Vasomotor rhinitis (98303026)  Neuropathy (2587682882)  History of breast cancer (0202676965)  Resolved  Breast cancer (054089232):  Resolved.  Cancer of kidney (317754657):  Resolved.  Comments:  2017 CST 11:24 AM CST - Estee Lemos CMA  right renal cell carcinoma s/p right nephrectomy  Nephrolithiasis (517571968):  Resolved.   Family History:    No family history items have been selected or recorded.   Procedure history:    Esophagogastroduodenoscopy (745730657) on 2018 at 77  Years.  Comments:  2018 9:36 AM CDT - Mary Peguero  Indication:  Abdominal pain (failed treatment).  History of ureteroscopy and laser lithotripsy (1492369381) on 1/15/2018 at 77 Years.  Comments:  2018 5:54 PM Estee Dominguez CMA  cysto, left retrograde pyelogram, fluoroscopy for left ureteral dilation, left ureteroscoy with holmium laser lithotripsy, left ureteral stent placement  Date of last mammogram (7207656510) on 2017 at 77 Years.  Comments:  2017 12:07 PM Estee Dominguez CMA  Unilateral Diag Mammo of the right breast   Recommend a 6-month f/u BL mammo for continuation of yrly screening.  Biopsy (123346693) on 3/23/2015 at 74 Years.  Comments:  2017 1:15 PM Reyna Rose  Of antrum.  Colonoscopy (241104855) in  at 71 Years.  Comments:  2017 12:07 PM Estee Dominguez CMA  Sigmoid polyp  Lumpectomy (7243096171).  Trigger thumb (836840584).  Comments:  2017 11:42 AM Reyna Rose  Surgery - both.  Cataract surgery (729172911).  Nephrectomy (9209252976).  Comments:  2017 1:38 PM Reyna Rose  Right  Total knee replacement (9980016795).  Comments:  2017 1:36 PM Reyna Rose  bilateral  Tonsillectomy (350981476).  Tubal ligation (114938577).  Radiation (064462115).   section (56689526).  Comments:  2017 1:34 PM Reyna Rose  X 1  Chemotherapy (144467256).   Social History:        Tobacco Assessment: Denies Tobacco Use            Never (less than 100 in lifetime)      Home and Environment Assessment            Marital status: .      Physical Examination   vital signs stable, as noted above   Vital Signs   2019 2:38 PM CDT Temperature Tympanic 98.9 DegF    Peripheral Pulse Rate 64 bpm    Systolic Blood Pressure 136 mmHg  HI    Diastolic Blood Pressure 90 mmHg  HI    Mean Arterial Pressure 105 mmHg    Systolic Blood Pressure Standing 132 mmHg    Diastolic Blood Pressure Standing 91 mmHg     Pulse Standing 92 bpm      Measurements from flowsheet : Measurements   4/4/2019 2:38 PM CDT     Weight Measured - Standard                151.8 lb     General:  Alert and oriented, No acute distress.    Respiratory:  Lungs are clear to auscultation, Respirations are non-labored.    Cardiovascular:  Normal rate, Regular rhythm, No murmur, No edema.    Gastrointestinal:  Soft, Non-tender, Non-distended.    Neurologic:  Alert.    Cognition and Speech:  Speech clear and coherent.    Psychiatric:  Cooperative, Appropriate mood & affect.       Review / Management   Results review:  Lab results   2/7/2019 11:05 AM CST UA WBC Clumps Present    UA Epithelial Cells Moderate    UA WBC >100    UA RBC     UA Bacteria Moderate   2/7/2019 10:47 AM CST Culture Urine See comment   2/7/2019 10:45 AM CST UA pH 7.0    UA Specific Gravity 1.020    UA Glucose NEGATIVE    UA Bilirubin NEGATIVE    UA Ketones TRACE    U Occult Blood 1+    UA Protein 1+    UA Nitrite NEGATIVE    UA Leuk Est 2+   .       Impression and Plan   Diagnosis     Unspecified dementia without behavioral disturbance (CGM38-JE F03.90).     History of breast cancer (XWL45-BI Z85.3).     Vasomotor rhinitis (ZPQ07-GW J30.0).     Nocturia (KSK92-FU R35.1).           .) new onset nausea - seems to correlate to introduction of Seroquel   - advised to start on zofran 4mg TID scheduled for 1-2 weeks and monitor symptoms   - compazine 5mg TID prn   - hopefully symptoms will subside as she stays on medication.  If not, then will need to think about reverting back to risperdal    .) dementia, - likely Alzheimer's vs. Lewy body; overall declining function   - following with neurology   - poor tolerance of acetylcholinesterase inhibitors   - currently on memantine 10mg BID, Seroquel 25mg BID   - no longer driving   - more recent issues with wandering and sundowning - safe in home, though spouse appears to be burning out    plan as previously outlined:     .) hypertension,  controlled  current antihypertensive regimen: lisinopril 10mg daily   regimen changes: none  intolerance:  future titration/work-up plan:     .) h/o nephrectomy and nephrolithiasis   - following with urology    .) h/o breast cancer   - prefers to continue with annual mammography    .) health maintenance   - no further CRC or cervical cancer screening   - info on advance direct provided    RTC as previously scheduled  s

## 2022-02-16 NOTE — CARE COORDINATION
Pt appears on  [facility] chronic disease panel as out of parameters for over 75 elevated BP.  05/07/19 [facility] for Continuity of Care-taking over Primary Care for Patient    Elmer Medina CMA.

## 2022-02-16 NOTE — TELEPHONE ENCOUNTER
Entered by Edmundo Sullivan on October 03, 2019 3:51:41 PM CDT  ---------------------  From: Edmundo Sullivan   To: [facility]    Sent: 10/3/2019 3:51:41 PM CDT  Subject: Medication Management     ** Not Approved:  **  memantine (MEMANTINE TAB HCL 10MG)  TAKE 1 TABLET BY MOUTH TWICE DAILY  Qty:  28 EA        Days Supply:  14        Refills:  10          Substitutions Allowed     Route To Pharmacy - [facility]   Note from Pharmacy:  Please authorize a 30 day supply with 11 refills. Note, MMP will default to a 30 day supply for responses to a refill request.  Signed by Edmundo Sullivan            ** Patient matched by Edmundo Sullivan on 10/3/2019 3:49:27 PM CDT **      ------------------------------------------  From: [facility]  To: Garrison Lopez MD  Sent: October 2, 2019 2:42:00 PM CDT  Subject: Medication Management  Due: October 3, 2019 2:42:00 PM CDT    ** On Hold Pending Signature **  Drug: memantine (memantine 10 mg oral tablet)  TAKE 1 TABLET BY MOUTH TWICE DAILY  Quantity: 12 EA       Days Supply: 6         Refills: 10  Substitutions Allowed  Notes from Pharmacy: Please authorize a 30 day supply with 11 refills. Note, MMP will default to a 30 day supply for responses to a refill request.    Dispensed Drug: memantine (memantine 10 mg oral tablet)  TAKE 1 TABLET BY MOUTH TWICE DAILY  Quantity: 28 EA       Days Supply: 14        Refills: 10  Substitutions Allowed  Notes from Pharmacy: Please authorize a 30 day supply with 11 refills. Note, MMP will default to a 30 day supply for responses to a refill request.  ---------------------------------------------------------------  From: Young Holguin LPNa K   To: [facility]    Sent: 10/4/2019 9:05:13 AM CDT  Subject: Medication Management     ** Not Approved: Refill not appropriate, filled for 1 year supply 10-2-2019 **  memantine (MEMANTINE TAB HCL 10MG)   TAKE 1 TABLET BY MOUTH TWICE DAILY  Qty:  28 EA        Days Supply:  14        Refills:  10          Substitutions Allowed     Route To Pharmacy - [facility]   Note from Pharmacy:  Please authorize a 30 day supply with 11 refills. Note, MMP will default to a 30 day supply for responses to a refill request.  Signed by Tatianna Holguni LPN

## 2022-02-16 NOTE — NURSING NOTE
Comprehensive Intake Entered On:  3/22/2019 1:34 PM CDT    Performed On:  3/22/2019 1:32 PM CDT by Estee Lemos CMA               Summary   Systolic Blood Pressure :   120 mmHg   Mean Arterial Pressure :   92 mmHg   Estee Lemos CMA - 3/22/2019 1:34 PM CDT   Chief Complaint :   1.  med check  2.  f/u ER for neck pain on 3/21   Weight Measured :   159 lb(Converted to: 159 lb 0 oz, 72.12 kg)    Diastolic Blood Pressure :   78 mmHg   Peripheral Pulse Rate :   88 bpm   Temperature Tympanic :   97.9 DegF(Converted to: 36.6 DegC)    Estee Lemos CMA - 3/22/2019 1:32 PM CDT   Health Status   Hospitalized since last visit? :   No   Inpatient? :   No   ED? :   Yes   Related to Condition :   Other: neck pain   Date of Discharge :   3/21/2019 CDT   Follow-up visit date :   3/22/2019 CDT   Discharge/Transition Plan Provided? :   Yes   Med List Reconciled? :   Yes   Estee Lemos CMA - 3/22/2019 1:32 PM CDT   Demographics   Last Name :   ALICIA   Address :   [address]   First Name :   SURINDER   Lydia Initial :   L   Responsible Party Date of Birth () :   1940 CDT   City :   [place]   State :   [place]   Zip Code :   [id number]   Estee Lemos CMA - 3/22/2019 1:32 PM CDT